# Patient Record
Sex: FEMALE | Race: WHITE | NOT HISPANIC OR LATINO | URBAN - METROPOLITAN AREA
[De-identification: names, ages, dates, MRNs, and addresses within clinical notes are randomized per-mention and may not be internally consistent; named-entity substitution may affect disease eponyms.]

---

## 2020-09-02 ENCOUNTER — EMERGENCY (EMERGENCY)
Facility: HOSPITAL | Age: 70
LOS: 0 days | Discharge: HOME | End: 2020-09-02
Attending: EMERGENCY MEDICINE | Admitting: EMERGENCY MEDICINE
Payer: MEDICARE

## 2020-09-02 VITALS
HEART RATE: 82 BPM | OXYGEN SATURATION: 99 % | WEIGHT: 149.91 LBS | TEMPERATURE: 97 F | HEIGHT: 66 IN | DIASTOLIC BLOOD PRESSURE: 76 MMHG | SYSTOLIC BLOOD PRESSURE: 144 MMHG | RESPIRATION RATE: 18 BRPM

## 2020-09-02 DIAGNOSIS — Z77.21 CONTACT WITH AND (SUSPECTED) EXPOSURE TO POTENTIALLY HAZARDOUS BODY FLUIDS: ICD-10-CM

## 2020-09-02 DIAGNOSIS — Z98.890 OTHER SPECIFIED POSTPROCEDURAL STATES: Chronic | ICD-10-CM

## 2020-09-02 PROCEDURE — 71046 X-RAY EXAM CHEST 2 VIEWS: CPT | Mod: 26

## 2020-09-02 PROCEDURE — 99283 EMERGENCY DEPT VISIT LOW MDM: CPT

## 2020-09-02 RX ORDER — ASPIRIN/CALCIUM CARB/MAGNESIUM 324 MG
1 TABLET ORAL
Qty: 0 | Refills: 0 | DISCHARGE

## 2020-09-02 NOTE — ED PROVIDER NOTE - CARE PROVIDER_API CALL
Benny Watson  Infectious Disease  [address] [place] [id number]  Phone: [phone]  Fax: [phone]  Follow Up Time: 1-3 Days

## 2020-09-02 NOTE — ED PROVIDER NOTE - CLINICAL SUMMARY MEDICAL DECISION MAKING FREE TEXT BOX
Patient is concerned for body fluid exposure in January a patient known to be hiv + with peg tube had splash in her eye she has developed night sweats but denies any headache, visual changes, chest pain, sob she has follow up with immunology next week but wants to be tested for HIV today.  we obtained. I will discharge with follow up to dr owen we discussed indications to return at this time

## 2020-09-02 NOTE — ED PROVIDER NOTE - NSFOLLOWUPINSTRUCTIONS_ED_ALL_ED_FT
Contact Precautions  Contact precautions are guidelines around caring for a person who has a disease that can be spread by contact with the patient or the patient's environment. Contact can happen through:  Contact between the patient's skin and another person's skin (skin-to-skin contact).Contact with bodily fluids, such as saliva, stool (feces), blood, and fluid from open wounds.Contact with items in the environment that have been contaminated with infected bodily fluids.Examples of diseases spread by contact with the patient or the patient's environment include methicillin-resistant Staphylococcus aureus (MRSA), vancomycin-resistant enterococci (VRE), Clostridioidesdifficile, respiratory syncytial virus (RSV), and some types of severe acute respiratory syndrome (SARS). Following these guidelines helps to prevent the disease from spreading to others.  Guidelines for patients  If you have a disease that can be spread by skin-to-skin contact or by contact with bodily fluids or items within the environment, follow these guidelines during your stay in the hospital:  Check with your nurse before you leave the room where you are being treated.Wear a gown and a covering or bandage (dressing) over the infected area if you need to leave your room.Wash your hands often with soap and water for at least 20 seconds. This is the best way to prevent spread of the disease. If soap and water are not available, use an alcohol-based hand .Guidelines for visitors  If you are visiting a person who has a disease that can be spread by direct contact with the person, or through his or her environment, follow these guidelines:  Check with a nurse before you enter a room that has a sign that says "Contact Precautions."If a nurse says that you can go into the room, you will be asked to wash your hands with soap and water or use an alcohol-based hand . You will also be asked to wear a gown and gloves.Do not take off your gown or gloves in the room until you are about to leave.Do not eat or drink in the room unless you ask a nurse first.Do not touch anything in the room unless you ask a nurse first.Right before you leave the room:  Take off your gloves first. Be careful not to touch the outside of the gloves.Take off your gown. Be careful not to touch the outside of the gown.Leave your gloves and gown in the room as told by the nurse.Wash your hands with soap and water for at least 20 seconds. This is the best way to prevent spread of the disease. If soap and water are not available, use an alcohol-based hand .Summary  Contact precautions are guidelines to prevent the spread of diseases through direct contact with a person's skin, a person's bodily fluids, or items within the person's environment.If you are a patient, ask a nurse before you leave your room. Wear a gown and a covering or bandage (dressing) over the infected area if you need to leave the room.If you are a visitor, wear a gown and gloves. Do not touch anything in the room. Do not eat or drink in the room.For both patients and visitors, washing hands often with soap and water for at least 20 seconds is the best way to prevent infection. If soap and water are not available, use an alcohol-based hand .This information is not intended to replace advice given to you by your health care provider. Make sure you discuss any questions you have with your health care provider.    Document Released: 05/03/2016 Document Revised: 05/18/2020 Document Reviewed: 05/18/2020  Elsevier Patient Education © 2020 [employer]

## 2020-09-02 NOTE — ED ADULT NURSE NOTE - NSIMPLEMENTINTERV_GEN_ALL_ED
Implemented All Universal Safety Interventions:  [place] to call system. Call bell, personal items and telephone within reach. Instruct patient to call for assistance. Room bathroom lighting operational. Non-slip footwear when patient is off stretcher. Physically safe environment: no spills, clutter or unnecessary equipment. Stretcher in lowest position, wheels locked, appropriate side rails in place.

## 2020-09-02 NOTE — ED PROVIDER NOTE - PROGRESS NOTE DETAILS
ATTENDING NOTE: 69 y/o F PMH CAD s/p stent and HTN presents to the ED requesting HIV testing. Pt was exposed to secretions from a peg tube of someone who is HIV positive back in January and over the past few months has been having night time sweats, nausea and intermittent fatigue all worsening over the past 2-3 days. Pt has no fevers, vomiting, diarrhea, chest pain, back pain or any joint pain and has no other medical complaints at this time. As of note, Pt has immunology follow up on Wednesday. On exam: NCAT. Lungs CTAB. RRR, S1S2 noted. Radial pulses 2+ and equal, pedal pulses 2+ and equal. Abdomen soft, NT/ND, no rebound or guarding. FROM x4 extremities. No focal neuro deficits. A/P: Will obtain HIV testing and CXR. Pt will remain in the ED for the results. Discussed results with pt.  All questions were answered and return precautions discussed.  Pt is asx and comfortable at this time.  Unremarkable re-exam.  No further concerns at this time from pt.  Will follow up with MARTINA and alyssa .  Pt understands and agrees with tx plan.

## 2020-09-02 NOTE — ED PROVIDER NOTE - ATTENDING CONTRIBUTION TO CARE
I was present for and supervised the key and critical aspects of the procedures performed during the care of the patient. ATTENDING NOTE: 71 y/o F PMH CAD s/p stent and HTN presents to the ED requesting HIV testing. Pt was exposed to secretions from a peg tube of someone who is HIV positive back in January and over the past few months has been having night time sweats, nausea and intermittent fatigue all worsening over the past 2-3 days. Pt has no fevers, vomiting, diarrhea, chest pain, back pain or any joint pain and has no other medical complaints at this time. As of note, Pt has immunology follow up on Wednesday. On exam: NCAT. Lungs CTAB. RRR, S1S2 noted. Radial pulses 2+ and equal, pedal pulses 2+ and equal. Abdomen soft, NT/ND, no rebound or guarding. FROM x4 extremities. No focal neuro deficits. A/P: Will obtain HIV testing and CXR. Pt will remain in the ED for the results.

## 2020-09-02 NOTE — ED PROVIDER NOTE - PATIENT PORTAL LINK FT
You can access the FollowMyHealth Patient Portal offered by [facility] by registering at the following website: [email or web address]. By joining [facility]’s FollowMyHealth portal, you will also be able to view your health information using other applications (apps) compatible with our system.

## 2020-09-02 NOTE — ED PROVIDER NOTE - NS ED ROS FT
Constitutional: + malaise.   Eyes: No visual changes, eye pain or discharge.   ENMT: No hearing changes, pain, discharge or infections.  Cardiac: No SOB or edema. No chest pain with exertion.  Respiratory: No cough or respiratory distress.   GI: No nausea, vomiting, diarrhea or abdominal pain.  : No dysuria, frequency or burning. No Discharge  MS: No myalgia, muscle weakness, joint pain or back pain.  Neuro: No headache or weakness.   Skin: No skin rash.  Except as documented in the HPI, all other systems are negative.

## 2020-09-02 NOTE — ED PROVIDER NOTE - OBJECTIVE STATEMENT
70 y.o female w/ hx of CAD, HTN, HLD presents to the ED for evaluation for HIV test.  States in January had gastric secretion from feeding tube go in eye.  Over past few months vague sxs including nausea, intermittent night sweats, abd cramping.  has been worked up by Dr. Watson's office and has f/u with immunologist next Wednesday.  States she does not believe she was tested for HIV and is requesting once at this time.  Does not want to wait for Wednesday. Currently asx.

## 2020-09-17 ENCOUNTER — TRANSCRIPTION ENCOUNTER (OUTPATIENT)
Age: 70
End: 2020-09-17

## 2020-11-05 PROBLEM — I42.9 CARDIOMYOPATHY, UNSPECIFIED: Chronic | Status: ACTIVE | Noted: 2020-09-02

## 2020-11-05 PROBLEM — I10 ESSENTIAL (PRIMARY) HYPERTENSION: Chronic | Status: ACTIVE | Noted: 2020-09-02

## 2020-11-05 PROBLEM — Z95.5 PRESENCE OF CORONARY ANGIOPLASTY IMPLANT AND GRAFT: Chronic | Status: ACTIVE | Noted: 2020-09-02

## 2020-11-06 ENCOUNTER — OUTPATIENT (OUTPATIENT)
Dept: OUTPATIENT SERVICES | Facility: HOSPITAL | Age: 70
LOS: 1 days | Discharge: HOME | End: 2020-11-06

## 2020-11-06 ENCOUNTER — APPOINTMENT (OUTPATIENT)
Dept: UROGYNECOLOGY | Facility: CLINIC | Age: 70
End: 2020-11-06
Payer: MEDICARE

## 2020-11-06 ENCOUNTER — RESULT CHARGE (OUTPATIENT)
Age: 70
End: 2020-11-06

## 2020-11-06 VITALS
SYSTOLIC BLOOD PRESSURE: 108 MMHG | BODY MASS INDEX: 25.71 KG/M2 | DIASTOLIC BLOOD PRESSURE: 70 MMHG | HEIGHT: 66 IN | WEIGHT: 160 LBS

## 2020-11-06 DIAGNOSIS — N95.2 POSTMENOPAUSAL ATROPHIC VAGINITIS: ICD-10-CM

## 2020-11-06 DIAGNOSIS — Z86.39 PERSONAL HISTORY OF OTHER ENDOCRINE, NUTRITIONAL AND METABOLIC DISEASE: ICD-10-CM

## 2020-11-06 DIAGNOSIS — N81.3 COMPLETE UTEROVAGINAL PROLAPSE: ICD-10-CM

## 2020-11-06 DIAGNOSIS — Z98.890 OTHER SPECIFIED POSTPROCEDURAL STATES: Chronic | ICD-10-CM

## 2020-11-06 DIAGNOSIS — Z78.9 OTHER SPECIFIED HEALTH STATUS: ICD-10-CM

## 2020-11-06 DIAGNOSIS — Z87.891 PERSONAL HISTORY OF NICOTINE DEPENDENCE: ICD-10-CM

## 2020-11-06 DIAGNOSIS — R39.15 URGENCY OF URINATION: ICD-10-CM

## 2020-11-06 LAB
BILIRUB UR QL STRIP: NORMAL
CLARITY UR: CLEAR
COLLECTION METHOD: NORMAL
GLUCOSE UR-MCNC: NORMAL
HCG UR QL: 0.2 EU/DL
HGB UR QL STRIP.AUTO: NORMAL
KETONES UR-MCNC: NORMAL
LEUKOCYTE ESTERASE UR QL STRIP: NORMAL
NITRITE UR QL STRIP: NORMAL
PH UR STRIP: 6
PROT UR STRIP-MCNC: NORMAL
SP GR UR STRIP: 1.01

## 2020-11-06 PROCEDURE — 99205 OFFICE O/P NEW HI 60 MIN: CPT

## 2020-11-06 PROCEDURE — 51701 INSERT BLADDER CATHETER: CPT

## 2020-11-07 LAB
APPEARANCE: CLEAR
BILIRUBIN URINE: NEGATIVE
BLOOD URINE: NEGATIVE
COLOR: YELLOW
GLUCOSE QUALITATIVE U: NEGATIVE
KETONES URINE: NEGATIVE
LEUKOCYTE ESTERASE URINE: NEGATIVE
NITRITE URINE: NEGATIVE
PH URINE: 6
PROTEIN URINE: NORMAL
SPECIFIC GRAVITY URINE: 1.03
UROBILINOGEN URINE: NORMAL

## 2020-11-07 NOTE — HISTORY OF PRESENT ILLNESS
[FreeTextEntry1] : \par Pt with pelvic floor dysfunction here for urogynecologic evaluation. She describes: \par \par Chief PFD: prolapse\par \par RN\par \par Pelvic organ prolapse: s/p cindy, +bulge, for 1.5 months, not worsening, denies splinting\par Stress urinary incontinence: denies\par Overactive bladder syndrome: +frequency, +urgency, +nocturia, no urge incontinence, for months, worsening, no prior treatment, no glaucoma\par Voiding dysfunction: yes Incomplete bladder emptying, yes hesitancy \par Lower urinary tract/vaginal symptoms: no recurrent UTIs per year, no hematuria, no dysuria, no bladder pain \par Fecal incontinence: denies\par Defecatory dysfunction: sausage\par Sexual dysfunction: minimally active, denies issues\par Pelvic pain: denies\par Vaginal dryness: denies\par \par Her pelvic floor symptoms are significantly bothersome and negatively impacting her quality of life. \par \par

## 2020-11-07 NOTE — COUNSELING
[FreeTextEntry1] : \par We will notify you of the urine results if they are abnormal\par \par Please increase your water intake to at least 5 cups of water per day\par \par For 4-5 days, cut one item from the list out of your diet.\par \par After 4-5 days, it it makes a difference, you have to decide if it is worth keeping it out of your diet to help with the urinary issues.\par \par If it does not make a difference, you should add it back into your diet and remove another item for another 4-5 days.\par \par Apply a pea size amount of the cream to the opening of the vagina every night for two weeks followed by three nights per week\par \par Please call my office if you have any issues with the cost or side effects of the medication.\par \par Schedule pessary fitting with my PADafne\par \par Please review the surgical handouts

## 2020-11-07 NOTE — DISCUSSION/SUMMARY
[FreeTextEntry1] : \par Complete uterovaginal prolapse-\par The patient was counseled regarding the possible natural progression of prolapse and the clinical consequences of worsening prolapse. The stage and the location of the prolapse was reviewed with the patient. She was counseled regarding the management strategies including observation, pessary placement and surgery. (Lefort colpocleisis versus robotic hysterectomyBS0//sacrocolpopexy). With further discussion about surgical types, the patient wants to preserve the length of the vagina for sexual activity and therefore the Lefort is off the table. The patient voiced understanding and agrees with pessary management.\par \par I WILL RECOMMEND AN EVALUATION WITH DR HOPKINS (COLORECTAL) TO RULE OUT RECTAL PROLAPSE PRIOR TO SCHEDULING HER FOR VAGINAL PROLAPSE SURGERY.\par \par Atrophic vaginitis-\par We reviewed the risks, benefits, alternatives and indications of local estrogen therapy and I gave her a handout that covers this information. She does opt to begin this therapy. I have given her a prescription and specific instructions on how to use the estrogen cream, applied with a finger at a low dose for urogenital atrophy.\par \par Urinary urgency-\par The pathophysiology of the above condition was discussed with the patient. The patient was given information and education on pelvic floor muscle exercises/rehabilitation, avoidance of dietary bladder irritants, and other strategies to improve bladder control, such as scheduled voiding. She was counseled regarding further management strategies for overactive bladder and urge incontinence including pelvic floor physical therapy, medications or surgical management. The patient voiced understanding and agrees with diet changes. We will follow up on her urine tests.\par \par

## 2020-11-07 NOTE — PHYSICAL EXAM
[Chaperone Present] : A chaperone was present in the examining room during all aspects of the physical examination [FreeTextEntry1] : Void: missed the hat, drank 2-3 cups of water in the bathroom\par PVR: 115 cc\par Urethra was prepped in sterile fashion and then a sterile catheter was used by me to drain the bladder.\par \par GH: 7    pB:  4 TVL: 8  C: 0  D: -5  Aa: +3 Ba: +7  Ap: -2 Bp:-2\par  \par laparoscopic incisions noted\par normal perineal sensation\par normal perineal reflexes\par negative cough stress test with and without reduction\par positive atrophy\par positive urethral caruncle\par positive urethral hypermobility\par bilateral levator ani spasm,  no tenderness\par no urethral tenderness\par no bladder tenderness\par no cervical tenderness\par no uterine tenderness\par good sphincter tone\par +hemorrhoids\par thin rectovaginal septum\par possible rectal prolapse\par 1/5 Kegel\par

## 2020-11-08 LAB — BACTERIA UR CULT: NORMAL

## 2020-12-01 ENCOUNTER — APPOINTMENT (OUTPATIENT)
Dept: UROGYNECOLOGY | Facility: CLINIC | Age: 70
End: 2020-12-01
Payer: MEDICARE

## 2020-12-01 ENCOUNTER — OUTPATIENT (OUTPATIENT)
Dept: OUTPATIENT SERVICES | Facility: HOSPITAL | Age: 70
LOS: 1 days | Discharge: HOME | End: 2020-12-01

## 2020-12-01 VITALS
SYSTOLIC BLOOD PRESSURE: 116 MMHG | WEIGHT: 155 LBS | TEMPERATURE: 97.8 F | BODY MASS INDEX: 25.02 KG/M2 | DIASTOLIC BLOOD PRESSURE: 60 MMHG

## 2020-12-01 DIAGNOSIS — Z98.890 OTHER SPECIFIED POSTPROCEDURAL STATES: Chronic | ICD-10-CM

## 2020-12-01 PROCEDURE — 57160 INSERT PESSARY/OTHER DEVICE: CPT

## 2020-12-01 NOTE — HISTORY OF PRESENT ILLNESS
[FreeTextEntry1] : Patient is here for pessary fitting. GH: 7 TVL: 8\par Last seen 11/6/20 as a new pt for prolapse and urge incontinence\par \par Pt is an LPN\par \par s/p cindy\par Pt is sexually active\par \par GH: 7 pB: 4 TVL: 8 C: 0 D: -5 Aa: +3 Ba: +7 Ap: -2 Bp:-2\par \par thin rectovaginal septum\par possible rectal prolapse\par \par PVR: 115cc (pt missed the hat)\par \par Estrace cream for atrophy: not using it every day\par \par Today pt states that the prolapse is always bothering her. States that her cardiologist started her on a water pill and she was urinating all the time, so she stopped it. Pt is c/o frequency and urgency and would like to start bladder medication at this time. Also states that she did not make an appt with Dr Cm as she did not understand that she had to do that. \par \par

## 2020-12-01 NOTE — DISCUSSION/SUMMARY
[FreeTextEntry1] : Complete Uterovaginal Prolapse:\par Ring and support #7 placed without difficulty. Remained in place with Valsalva, coughing, and ambulating. \par The patient tolerated all fittings well.\par Patient was taught to remove and the place the pessary on her own as she would like to be able to do that at home.\par \par Urge Incontinence\par Rx Trospium 20mg BID.\par Precautions reviewed.\par \par Atrophic Vaginitis:\par Advised to continue to apply a pea size amount of the cream to the opening of the vagina three nights per week.\par \par Pt provided with Dr Cm information to make an appointment to rule out rectal prolapse prior to pt being considered for surgical management with Dr Batista.\par \par Will return in 3 weeks for pessary check and in 6 weeks for follow up for med check or earlier if she has any issues.\par \par \par

## 2020-12-01 NOTE — COUNSELING
[FreeTextEntry1] : Please call the office if you have any issues with vaginal pain, vaginal bleeding, difficulty urinating or having a bowel movement or if the pessary falls out so that we can arrange another size pessary.\par \par Please follow up for pessary maintenance in 2-3 weeks with NIHARIKA Leos\par \par If you feel like you have an infection it is important for you to call our office and we will arrange testing of your urine.\par \par Please begin taking Trospium 20mg twice a day. It takes up to 6 weeks to go into full effect. Please  your refill when you complete the 1st bottle.\par \par Please continue to apply a pea size amount to the opening of the vagina three times a week. \par \par Please call my office if you have any issues with the cost or side effects of the medication. \par \par Please call Dr Ronnie Cm at [phone] to make an appointment for evaluation to rule out rectal prolapse. \par \par Schedule a 6 weeks follow up med check appointment.\par \par

## 2020-12-02 ENCOUNTER — TRANSCRIPTION ENCOUNTER (OUTPATIENT)
Age: 70
End: 2020-12-02

## 2020-12-04 DIAGNOSIS — N95.2 POSTMENOPAUSAL ATROPHIC VAGINITIS: ICD-10-CM

## 2020-12-04 DIAGNOSIS — N81.3 COMPLETE UTEROVAGINAL PROLAPSE: ICD-10-CM

## 2020-12-04 DIAGNOSIS — R39.15 URGENCY OF URINATION: ICD-10-CM

## 2020-12-07 ENCOUNTER — NON-APPOINTMENT (OUTPATIENT)
Age: 70
End: 2020-12-07

## 2020-12-14 ENCOUNTER — APPOINTMENT (OUTPATIENT)
Dept: UROGYNECOLOGY | Facility: CLINIC | Age: 70
End: 2020-12-14

## 2020-12-15 ENCOUNTER — OUTPATIENT (OUTPATIENT)
Dept: OUTPATIENT SERVICES | Facility: HOSPITAL | Age: 70
LOS: 1 days | Discharge: HOME | End: 2020-12-15

## 2020-12-15 ENCOUNTER — APPOINTMENT (OUTPATIENT)
Dept: UROGYNECOLOGY | Facility: CLINIC | Age: 70
End: 2020-12-15
Payer: MEDICARE

## 2020-12-15 VITALS — HEIGHT: 66 IN | DIASTOLIC BLOOD PRESSURE: 62 MMHG | SYSTOLIC BLOOD PRESSURE: 98 MMHG

## 2020-12-15 DIAGNOSIS — R39.15 URGENCY OF URINATION: ICD-10-CM

## 2020-12-15 DIAGNOSIS — Z98.890 OTHER SPECIFIED POSTPROCEDURAL STATES: Chronic | ICD-10-CM

## 2020-12-15 PROCEDURE — 99213 OFFICE O/P EST LOW 20 MIN: CPT

## 2020-12-15 NOTE — COUNSELING
[FreeTextEntry1] : Please call the office if you have any issues with vaginal pain, vaginal bleeding, difficulty urinating or having a bowel movement or if the pessary falls out so that we can arrange another size pessary.\par \par Please continue to apply a pea size amount of the cream to the opening of the vagina three nights per week.\par \par Pleases restart Trospium 20mg twice a day if you feel that urinary frequency is bothersome again. It takes up to 6 weeks for medication to take full effect. Please use bowel recipe for constipation. You may also take Colace, or Dulcolax or Miralax over the counter for constipation. \par \par Please follow up for pessary maintenance in 3 months with NIHARIKA Leos\par

## 2020-12-15 NOTE — HISTORY OF PRESENT ILLNESS
[FreeTextEntry1] : Patient is here for routine pessary check and cleaning for incomplete uterovaginal prolapse.\par Last seen 12/1/20 for pessary insertion, Ring and support #7\par \par Pt is an LPN\par \par s/p cindy\par Pt is sexually active\par \par GH: 7 pB: 4 TVL: 8 C: 0 D: -5 Aa: +3 Ba: +7 Ap: -2 Bp:-2\par \par thin rectovaginal septum\par possible rectal prolapse\par \par PVR: 115cc (pt missed the hat)\par \par Estrace cream for atrophy\par \par Today, patient is doing well and has no complaints. States that her bulge and prolapse is much better with the pessary in. States that She stopped Trospium as it gave her constipation and her hair was falling out. Reports that her urinary frequency is much better and doesn’t want to take Trospium at this time. \par

## 2020-12-15 NOTE — DISCUSSION/SUMMARY
[FreeTextEntry1] : Incomplete Uterovaginal Prolapse:\par Ring and support #7 removed, cleaned, inspected and reinserted. The patient tolerated this well. \par No bleeding, lesions, abnormal discharge were noted. \par The patient will follow up in 3 months for routine pessary management.\par \par Atrophic Vaginitis:\par Advised to continue to apply a pea size amount of the cream to the opening of the vagina three nights per week.\par \par Urinary urgency\par Advised to restart Trospium 20mg BID if her urinary symptoms become bothersome again.\par If she restarts Trospium advised to call and make a 6 weeks f/u med check appt\par Advised to use bowel recipe or take stool softeners for constipation\par \par Vaginal discharge\par Vaginitis culture obtained. \par \par Pt may [place] or [place]. Advised that she will need to find a urogyn or gyn office for pessary cleaning every 3 months. Pt verbalized understanding.

## 2020-12-15 NOTE — PHYSICAL EXAM
[Chaperone Present] : A chaperone was present in the examining room during all aspects of the physical examination [No Acute Distress] : in no acute distress [Well developed] : well developed [Well Nourished] : ~L well nourished [FreeTextEntry1] : + copious amount of milky vaginal discharge. Cultures obtained.

## 2020-12-21 ENCOUNTER — NON-APPOINTMENT (OUTPATIENT)
Age: 70
End: 2020-12-21

## 2020-12-22 ENCOUNTER — APPOINTMENT (OUTPATIENT)
Dept: SURGERY | Facility: CLINIC | Age: 70
End: 2020-12-22

## 2020-12-22 ENCOUNTER — APPOINTMENT (OUTPATIENT)
Dept: SURGERY | Facility: CLINIC | Age: 70
End: 2020-12-22
Payer: MEDICARE

## 2020-12-22 VITALS
SYSTOLIC BLOOD PRESSURE: 120 MMHG | WEIGHT: 156 LBS | DIASTOLIC BLOOD PRESSURE: 70 MMHG | BODY MASS INDEX: 25.07 KG/M2 | TEMPERATURE: 96.1 F | HEIGHT: 66 IN | HEART RATE: 66 BPM

## 2020-12-22 PROCEDURE — 46600 DIAGNOSTIC ANOSCOPY SPX: CPT

## 2020-12-22 PROCEDURE — 99203 OFFICE O/P NEW LOW 30 MIN: CPT

## 2020-12-22 NOTE — ASSESSMENT
[FreeTextEntry1] : Ms. PISANO has pelvic organ prolapse. She denies fecal incontinence and or difficulty with her bowel movements other than occasional constipation. She has no notable rectal prolapse on physical exam. If she had any symptoms or questionable physical exam features I would send her for a definite progress. I believe Ms. ANGULO does not have rectal prolapse. I have advised her that rarely after having repaired the uterine vaginal prolapse rectal rectal prolapse becomes evident. That being said I do not believe she needs further workup for rectal prolapse at this time. She can proceed with the pelvic organ reconstruction and followup with me as needed.

## 2020-12-22 NOTE — PROCEDURE
[FreeTextEntry1] : Anoscopy performed using a disposable anoscope.  The patient was place in the left lateral decubitus position. After MERCEDEZ was performed the anoscope was inserted and the distal rectum was evaluated along with the anal canal and anal margin.\par \par Findings:\par Examination of the perineum reveals minor perianal skin tags is no erythema induration or drainage or bleeding. There is no significant perineal to send with Valsalva. There is no obvious rectal prolapse with Valsalva.\par \par MERCEDEZ, good tone, no palpable mass, no palpable rectal prolapse with Valsalva.\par \par Anoscopy, grade 2 internal hemorrhoids, no active bleeding, no stigmata of rectal prolapse.

## 2020-12-23 DIAGNOSIS — N89.8 OTHER SPECIFIED NONINFLAMMATORY DISORDERS OF VAGINA: ICD-10-CM

## 2020-12-23 DIAGNOSIS — N95.2 POSTMENOPAUSAL ATROPHIC VAGINITIS: ICD-10-CM

## 2020-12-23 DIAGNOSIS — R39.15 URGENCY OF URINATION: ICD-10-CM

## 2020-12-23 DIAGNOSIS — N81.3 COMPLETE UTEROVAGINAL PROLAPSE: ICD-10-CM

## 2021-01-13 ENCOUNTER — OUTPATIENT (OUTPATIENT)
Dept: OUTPATIENT SERVICES | Facility: HOSPITAL | Age: 71
LOS: 1 days | Discharge: HOME | End: 2021-01-13

## 2021-01-13 ENCOUNTER — APPOINTMENT (OUTPATIENT)
Dept: UROGYNECOLOGY | Facility: CLINIC | Age: 71
End: 2021-01-13
Payer: MEDICARE

## 2021-01-13 VITALS
SYSTOLIC BLOOD PRESSURE: 100 MMHG | BODY MASS INDEX: 25.07 KG/M2 | WEIGHT: 156 LBS | DIASTOLIC BLOOD PRESSURE: 62 MMHG | HEIGHT: 66 IN

## 2021-01-13 DIAGNOSIS — Z98.890 OTHER SPECIFIED POSTPROCEDURAL STATES: Chronic | ICD-10-CM

## 2021-01-13 PROCEDURE — 99213 OFFICE O/P EST LOW 20 MIN: CPT

## 2021-01-13 RX ORDER — TROSPIUM CHLORIDE 20 MG/1
20 TABLET, FILM COATED ORAL
Qty: 60 | Refills: 1 | Status: DISCONTINUED | COMMUNITY
Start: 2020-12-01 | End: 2021-01-13

## 2021-01-13 NOTE — DISCUSSION/SUMMARY
[FreeTextEntry1] : Uterovaginal prolapse\par F/u in 2 months for pessary cleaning\par \par Atrophic Vaginitis:\par Advised to continue to apply a pea size amount of the cream to the opening of the vagina three nights per week.\par

## 2021-01-13 NOTE — HISTORY OF PRESENT ILLNESS
[FreeTextEntry1] : Pt is here for follow up. \par Last seen on 12/15/20 for pessary cleaning Ring and support #7\par \par Pt is an LPN\par \par s/p cindy\par Pt is sexually active\par \par GH: 7 pB: 4 TVL: 8 C: 0 D: -5 Aa: +3 Ba: +7 Ap: -2 Bp:-2\par \par thin rectovaginal septum\par possible rectal prolapse\par \par PVR: 115cc (pt missed the hat)\par \par Estrace cream for atrophy\par Bowel recipe\par s/p Trospium 20mg BID: caused constipation\par \par Today pt states that she's doing well. She did not restart Trospium as it was causing constipation. She did not try bowel recipe yet but plans on it. States that she has been seeing a cardiologist and wanted to know if she needs to have a heart surgery if she can have the pessary in at that time. She's not moving to [place] at this time. Is not bothered by urgency and frequency any longer.\par

## 2021-01-26 ENCOUNTER — RX RENEWAL (OUTPATIENT)
Age: 71
End: 2021-01-26

## 2021-03-17 ENCOUNTER — APPOINTMENT (OUTPATIENT)
Dept: UROGYNECOLOGY | Facility: CLINIC | Age: 71
End: 2021-03-17
Payer: MEDICARE

## 2021-03-17 ENCOUNTER — OUTPATIENT (OUTPATIENT)
Dept: OUTPATIENT SERVICES | Facility: HOSPITAL | Age: 71
LOS: 1 days | Discharge: HOME | End: 2021-03-17

## 2021-03-17 VITALS
HEIGHT: 66 IN | BODY MASS INDEX: 25.07 KG/M2 | WEIGHT: 156 LBS | DIASTOLIC BLOOD PRESSURE: 64 MMHG | SYSTOLIC BLOOD PRESSURE: 102 MMHG

## 2021-03-17 DIAGNOSIS — Z98.890 OTHER SPECIFIED POSTPROCEDURAL STATES: Chronic | ICD-10-CM

## 2021-03-17 PROCEDURE — 99213 OFFICE O/P EST LOW 20 MIN: CPT

## 2021-03-17 NOTE — HISTORY OF PRESENT ILLNESS
[FreeTextEntry1] : Patient is here for routine pessary cleaning for incomplete uterovaginal prolapse. ring and support #7 \par Last seen 1/13/21 for follow up for incontinence. \par \par Pt is an LPN\par \par s/p cindy\par Pt is sexually active\par \par GH: 7 pB: 4 TVL: 8 C: 0 D: -5 Aa: +3 Ba: +7 Ap: -2 Bp:-2\par \par thin rectovaginal septum\par possible rectal prolapse\par \par PVR: 115cc (pt missed the hat)\par \par Estrace cream for atrophy\par Bowel recipe\par s/p Trospium 20mg BID: caused constipation\par \par Today, patient is doing well and has no complaints. She's not taking Trospium doesn't remember if she took it or not. She's more concerned with seeing a cardiologist, has been having chest pain, feels blockages. She does not feel that  she's leaking or having any bladder problems right now. Feels that the pessary is helping her with the prolapse. Does c/o vaginal discharge and has been removing and cleaning the pessary herself every few weeks. \par

## 2021-03-17 NOTE — COUNSELING
[FreeTextEntry1] : Please call the office if you have any issues with vaginal pain, vaginal bleeding, difficulty urinating or having a bowel movement or if the pessary falls out so that we can arrange another size pessary.\par \par Please continue to apply a pea size amount of the cream to the opening of the vagina three nights per week.\par \par Please follow up for pessary maintenance in 3 months with NIHARIKA Leos\par \par

## 2021-03-17 NOTE — DISCUSSION/SUMMARY
[FreeTextEntry1] : Incomplete Uterovaginal Prolapse:\par Ring and support #7  removed, cleaned, inspected and reinserted. The patient tolerated this well. \par No bleeding, lesions, abnormal discharge were noted. \par The patient will follow up in 3 months for routine pessary management.\par \par Atrophic Vaginitis:\par Advised to continue to apply a pea size amount of the cream to the opening of the vagina three nights per week.\par \par Vaginal discharge\par culture obtained, will call pt if abnormal results

## 2021-03-17 NOTE — PHYSICAL EXAM
[Chaperone Present] : A chaperone was present in the examining room during all aspects of the physical examination [FreeTextEntry1] : + copious vaginal discharge [No Acute Distress] : in no acute distress [Well developed] : well developed [Well Nourished] : ~L well nourished

## 2021-03-25 DIAGNOSIS — N81.3 COMPLETE UTEROVAGINAL PROLAPSE: ICD-10-CM

## 2021-03-25 DIAGNOSIS — N95.2 POSTMENOPAUSAL ATROPHIC VAGINITIS: ICD-10-CM

## 2021-03-25 DIAGNOSIS — N89.8 OTHER SPECIFIED NONINFLAMMATORY DISORDERS OF VAGINA: ICD-10-CM

## 2021-06-15 ENCOUNTER — OUTPATIENT (OUTPATIENT)
Dept: OUTPATIENT SERVICES | Facility: HOSPITAL | Age: 71
LOS: 1 days | Discharge: HOME | End: 2021-06-15

## 2021-06-15 ENCOUNTER — APPOINTMENT (OUTPATIENT)
Dept: UROGYNECOLOGY | Facility: CLINIC | Age: 71
End: 2021-06-15
Payer: MEDICARE

## 2021-06-15 VITALS
DIASTOLIC BLOOD PRESSURE: 68 MMHG | HEIGHT: 66 IN | WEIGHT: 156 LBS | SYSTOLIC BLOOD PRESSURE: 112 MMHG | BODY MASS INDEX: 25.07 KG/M2

## 2021-06-15 DIAGNOSIS — N95.2 POSTMENOPAUSAL ATROPHIC VAGINITIS: ICD-10-CM

## 2021-06-15 DIAGNOSIS — Z98.890 OTHER SPECIFIED POSTPROCEDURAL STATES: Chronic | ICD-10-CM

## 2021-06-15 PROCEDURE — 99213 OFFICE O/P EST LOW 20 MIN: CPT

## 2021-06-15 NOTE — DISCUSSION/SUMMARY
[FreeTextEntry1] : Complete Uterovaginal Prolapse:\par Pt has her pessary with her. Does not want to place it back in today as it causes discharge and it doesn't bother her. Pt is considering surgical management for the prolapse. Advised to first take care of her cardiac issues and then follow up for further management for the prolapse. \par Follow up 6 months or sooner if needed. \par \par Atrophic Vaginitis:\par Advised to continue to apply a pea size amount of the cream to the opening of the vagina three nights per week.\par

## 2021-06-15 NOTE — COUNSELING
[FreeTextEntry1] : Please call the office if you have any issues with vaginal pain, vaginal bleeding, difficulty urinating or having a bowel movement. \par \par Please bring the pessary with you if you would like to have it re-inserted. \par \par Please continue to apply a pea size amount of the cream to the opening of the vagina three nights per week.\par \par Please follow up in 6 months or as needed with NIHARIKA Leos\par

## 2021-06-15 NOTE — HISTORY OF PRESENT ILLNESS
[FreeTextEntry1] : Patient is here for routine pessary cleaning for incomplete uterovaginal prolapse.\par Last seen 3/17/21 for pessary cleaning. ring and support # 7\par \par Pt is an LPN\par \par s/p cindy\par Pt is sexually active\par \par GH: 7 pB: 4 TVL: 8 C: 0 D: -5 Aa: +3 Ba: +7 Ap: -2 Bp:-2\par \par thin rectovaginal septum\par possible rectal prolapse\par \par PVR: 115cc (pt missed the hat)\par \par Estrace cream for atrophy\par Bowel recipe\par s/p Trospium 20mg BID: caused constipation\par \par Today, patient is doing well and has no complaints. She took out the pessary herself and brought it with her. States that she lives in [place] and [place] and was seen by a GYN in [place], had pap smear done that was abnormal then had colpo done with a biopsy, did not get results, assumes that they are normal. States that she has been putting the pessary in and taking it out. It continues to cause the discharge and she is doing ok w/o the pessary. Considering to have surgery for her incomplete uterine prolapse. \par Pt also states that she has been undergoing cardiac work up and needs to have open heart surgery for septal defect. She was going to do it in [place] but decided to do it in [place]. \par

## 2021-09-14 ENCOUNTER — APPOINTMENT (OUTPATIENT)
Dept: UROGYNECOLOGY | Facility: CLINIC | Age: 71
End: 2021-09-14
Payer: MEDICARE

## 2021-09-14 ENCOUNTER — OUTPATIENT (OUTPATIENT)
Dept: OUTPATIENT SERVICES | Facility: HOSPITAL | Age: 71
LOS: 1 days | Discharge: HOME | End: 2021-09-14

## 2021-09-14 VITALS — DIASTOLIC BLOOD PRESSURE: 65 MMHG | BODY MASS INDEX: 22.76 KG/M2 | WEIGHT: 141 LBS | SYSTOLIC BLOOD PRESSURE: 135 MMHG

## 2021-09-14 DIAGNOSIS — K59.00 CONSTIPATION, UNSPECIFIED: ICD-10-CM

## 2021-09-14 DIAGNOSIS — Z86.79 PERSONAL HISTORY OF OTHER DISEASES OF THE CIRCULATORY SYSTEM: ICD-10-CM

## 2021-09-14 DIAGNOSIS — Z98.890 OTHER SPECIFIED POSTPROCEDURAL STATES: Chronic | ICD-10-CM

## 2021-09-14 PROCEDURE — 99213 OFFICE O/P EST LOW 20 MIN: CPT

## 2021-09-14 RX ORDER — POTASSIUM CHLORIDE 1500 MG/1
20 TABLET, EXTENDED RELEASE ORAL
Qty: 30 | Refills: 0 | Status: DISCONTINUED | COMMUNITY
Start: 2021-07-27

## 2021-09-14 RX ORDER — ESTRADIOL 0.1 MG/G
0.1 CREAM VAGINAL
Qty: 1 | Refills: 3 | Status: DISCONTINUED | COMMUNITY
Start: 2020-11-06 | End: 2021-09-14

## 2021-09-14 RX ORDER — ATORVASTATIN CALCIUM 10 MG/1
10 TABLET, FILM COATED ORAL
Qty: 90 | Refills: 0 | Status: DISCONTINUED | COMMUNITY
Start: 2020-10-02

## 2021-09-14 RX ORDER — TRIAMTERENE AND HYDROCHLOROTHIAZIDE 25; 37.5 MG/1; MG/1
37.5-25 TABLET ORAL
Qty: 90 | Refills: 0 | Status: DISCONTINUED | COMMUNITY
Start: 2021-05-18

## 2021-09-14 RX ORDER — DISOPYRAMIDE PHOSPHATE 150 MG/1
150 CAPSULE, GELATIN COATED ORAL
Qty: 270 | Refills: 0 | Status: DISCONTINUED | COMMUNITY
Start: 2021-02-19

## 2021-09-14 RX ORDER — ATORVASTATIN CALCIUM 40 MG/1
40 TABLET, FILM COATED ORAL
Qty: 30 | Refills: 0 | Status: DISCONTINUED | COMMUNITY
Start: 2021-08-24

## 2021-09-14 RX ORDER — METRONIDAZOLE 7.5 MG/G
0.75 GEL VAGINAL
Qty: 70 | Refills: 0 | Status: DISCONTINUED | COMMUNITY
Start: 2021-05-19

## 2021-09-14 RX ORDER — METRONIDAZOLE 500 MG/1
500 TABLET ORAL TWICE DAILY
Qty: 14 | Refills: 0 | Status: DISCONTINUED | COMMUNITY
Start: 2020-12-17 | End: 2021-09-14

## 2021-09-14 RX ORDER — AMIODARONE HYDROCHLORIDE 200 MG/1
200 TABLET ORAL
Qty: 90 | Refills: 0 | Status: DISCONTINUED | COMMUNITY
Start: 2021-08-09

## 2021-09-14 RX ORDER — ASPIRIN 81 MG/1
81 TABLET, COATED ORAL
Qty: 30 | Refills: 0 | Status: DISCONTINUED | COMMUNITY
Start: 2021-07-27

## 2021-09-14 RX ORDER — TRIAMTERENE AND HYDROCHLOROTHIAZIDE 37.5; 25 MG/1; MG/1
37.5-25 CAPSULE ORAL
Refills: 0 | Status: DISCONTINUED | COMMUNITY
End: 2021-09-14

## 2021-09-14 RX ORDER — FUROSEMIDE 40 MG/1
40 TABLET ORAL
Qty: 30 | Refills: 0 | Status: DISCONTINUED | COMMUNITY
Start: 2021-07-27

## 2021-09-14 RX ORDER — IVERMECTIN 3 MG/1
3 TABLET ORAL
Qty: 4 | Refills: 0 | Status: DISCONTINUED | COMMUNITY
Start: 2021-07-19

## 2021-09-14 RX ORDER — AMIODARONE HYDROCHLORIDE 400 MG/1
400 TABLET ORAL
Qty: 20 | Refills: 0 | Status: DISCONTINUED | COMMUNITY
Start: 2021-07-27

## 2021-09-14 RX ORDER — SIMVASTATIN 40 MG/1
40 TABLET, FILM COATED ORAL
Refills: 0 | Status: DISCONTINUED | COMMUNITY
End: 2021-09-14

## 2021-09-14 RX ORDER — OXYCODONE 5 MG/1
5 TABLET ORAL
Qty: 20 | Refills: 0 | Status: DISCONTINUED | COMMUNITY
Start: 2021-07-27

## 2021-09-14 RX ORDER — METOPROLOL SUCCINATE 100 MG/1
100 TABLET, EXTENDED RELEASE ORAL
Qty: 90 | Refills: 0 | Status: DISCONTINUED | COMMUNITY
Start: 2020-10-30

## 2021-09-14 NOTE — COUNSELING
[FreeTextEntry1] : Please call to schedule UDS (bladder function test) when you are ready to consider prolapse surgery. \par \par Please follow up with your cardiologist. \par \par

## 2021-09-14 NOTE — HISTORY OF PRESENT ILLNESS
[FreeTextEntry1] : Patient is here for follow up on complete uterovaginal prolapse. \par Last seen on 6/15/21 for follow up. \par \par Pt is an LPN\par \par s/p cindy\par Pt is sexually active\par \par GH: 7 pB: 4 TVL: 8 C: 0 D: -5 Aa: +3 Ba: +7 Ap: -2 Bp:-2\par \par thin rectovaginal septum\par possible rectal prolapse\par \par PVR: 115cc (pt missed the hat)\par \par Estrace cream for atrophy\par Bowel recipe\par s/p Trospium 20mg BID: caused constipation\par \par Today pt states that she had open heart surgery 2 months ago 7/15/21 at [facility]. Has been having some issues and was told t hat she might need a pacemaker. She is continuing to follow up with her cardiologist. Pt is here today c/o on and off constipation. Also interested in prolapse surgery and is wondering if the surgery for uterine prolapse will help her constipation. She has used bowel recipe with some benefit, also uses metamucil and last BM was today. Denies any urinary complaints. Still is bothered by the prolapse and has her pessary with her but does not want it reinserted.

## 2021-09-14 NOTE — DISCUSSION/SUMMARY
[FreeTextEntry1] : Complete uterovaginal prolapse\par Pt advised when she is medically stable and ready for surgery to call us back to schedule UDS with reduction and surgical counseling with Dr Batista. \par UDS procedure explained to pt.\par \par Constipation\par Discussed bowel recipe, Miralax, MOM\par Patient voiced her understanding and agrees with the plan.\par \par Follow up prn.

## 2022-04-18 ENCOUNTER — INPATIENT (INPATIENT)
Facility: HOSPITAL | Age: 72
LOS: 1 days | Discharge: HOME | End: 2022-04-20
Attending: INTERNAL MEDICINE | Admitting: INTERNAL MEDICINE
Payer: MEDICARE

## 2022-04-18 VITALS
HEART RATE: 114 BPM | DIASTOLIC BLOOD PRESSURE: 83 MMHG | SYSTOLIC BLOOD PRESSURE: 147 MMHG | OXYGEN SATURATION: 90 % | RESPIRATION RATE: 18 BRPM

## 2022-04-18 DIAGNOSIS — Z98.890 OTHER SPECIFIED POSTPROCEDURAL STATES: Chronic | ICD-10-CM

## 2022-04-18 LAB
ALBUMIN SERPL ELPH-MCNC: 3.8 G/DL — SIGNIFICANT CHANGE UP (ref 3.5–5.2)
ALP SERPL-CCNC: 78 U/L — SIGNIFICANT CHANGE UP (ref 30–115)
ALT FLD-CCNC: 48 U/L — HIGH (ref 0–41)
ANION GAP SERPL CALC-SCNC: 23 MMOL/L — HIGH (ref 7–14)
AST SERPL-CCNC: 54 U/L — HIGH (ref 0–41)
BASE EXCESS BLDV CALC-SCNC: -13.7 MMOL/L — LOW (ref -2–3)
BASE EXCESS BLDV CALC-SCNC: -8.7 MMOL/L — LOW (ref -2–3)
BASOPHILS # BLD AUTO: 0.12 K/UL — SIGNIFICANT CHANGE UP (ref 0–0.2)
BASOPHILS NFR BLD AUTO: 0.9 % — SIGNIFICANT CHANGE UP (ref 0–1)
BILIRUB SERPL-MCNC: 0.4 MG/DL — SIGNIFICANT CHANGE UP (ref 0.2–1.2)
BUN SERPL-MCNC: 26 MG/DL — HIGH (ref 10–20)
CA-I SERPL-SCNC: 1.32 MMOL/L — SIGNIFICANT CHANGE UP (ref 1.15–1.33)
CA-I SERPL-SCNC: 2.02 MMOL/L — CRITICAL HIGH (ref 1.15–1.33)
CALCIUM SERPL-MCNC: 9.8 MG/DL — SIGNIFICANT CHANGE UP (ref 8.5–10.1)
CHLORIDE SERPL-SCNC: 103 MMOL/L — SIGNIFICANT CHANGE UP (ref 98–110)
CO2 SERPL-SCNC: 16 MMOL/L — LOW (ref 17–32)
CREAT SERPL-MCNC: 0.9 MG/DL — SIGNIFICANT CHANGE UP (ref 0.7–1.5)
EGFR: 68 ML/MIN/1.73M2 — SIGNIFICANT CHANGE UP
EOSINOPHIL # BLD AUTO: 0.26 K/UL — SIGNIFICANT CHANGE UP (ref 0–0.7)
EOSINOPHIL NFR BLD AUTO: 2 % — SIGNIFICANT CHANGE UP (ref 0–8)
GAS PNL BLDA: SIGNIFICANT CHANGE UP
GAS PNL BLDV: 137 MMOL/L — SIGNIFICANT CHANGE UP (ref 136–145)
GAS PNL BLDV: 139 MMOL/L — SIGNIFICANT CHANGE UP (ref 136–145)
GAS PNL BLDV: SIGNIFICANT CHANGE UP
GAS PNL BLDV: SIGNIFICANT CHANGE UP
GLUCOSE SERPL-MCNC: 197 MG/DL — HIGH (ref 70–99)
HCO3 BLDV-SCNC: 20 MMOL/L — LOW (ref 22–29)
HCO3 BLDV-SCNC: 20 MMOL/L — LOW (ref 22–29)
HCT VFR BLD CALC: 43.2 % — SIGNIFICANT CHANGE UP (ref 37–47)
HCT VFR BLDA CALC: 34 % — LOW (ref 39–51)
HCT VFR BLDA CALC: 42 % — SIGNIFICANT CHANGE UP (ref 39–51)
HGB BLD CALC-MCNC: 11.4 G/DL — LOW (ref 12.6–17.4)
HGB BLD CALC-MCNC: 13.9 G/DL — SIGNIFICANT CHANGE UP (ref 12.6–17.4)
HGB BLD-MCNC: 13.5 G/DL — SIGNIFICANT CHANGE UP (ref 12–16)
IMM GRANULOCYTES NFR BLD AUTO: 0.4 % — HIGH (ref 0.1–0.3)
LACTATE BLDV-MCNC: 13.8 MMOL/L — CRITICAL HIGH (ref 0.5–2)
LACTATE BLDV-MCNC: 3.1 MMOL/L — HIGH (ref 0.5–2)
LACTATE SERPL-SCNC: 1.5 MMOL/L — SIGNIFICANT CHANGE UP (ref 0.7–2)
LYMPHOCYTES # BLD AUTO: 46.1 % — SIGNIFICANT CHANGE UP (ref 20.5–51.1)
LYMPHOCYTES # BLD AUTO: 5.88 K/UL — HIGH (ref 1.2–3.4)
MAGNESIUM SERPL-MCNC: 2.1 MG/DL — SIGNIFICANT CHANGE UP (ref 1.8–2.4)
MCHC RBC-ENTMCNC: 30.1 PG — SIGNIFICANT CHANGE UP (ref 27–31)
MCHC RBC-ENTMCNC: 31.3 G/DL — LOW (ref 32–37)
MCV RBC AUTO: 96.4 FL — SIGNIFICANT CHANGE UP (ref 81–99)
MONOCYTES # BLD AUTO: 0.71 K/UL — HIGH (ref 0.1–0.6)
MONOCYTES NFR BLD AUTO: 5.6 % — SIGNIFICANT CHANGE UP (ref 1.7–9.3)
NEUTROPHILS # BLD AUTO: 5.73 K/UL — SIGNIFICANT CHANGE UP (ref 1.4–6.5)
NEUTROPHILS NFR BLD AUTO: 45 % — SIGNIFICANT CHANGE UP (ref 42.2–75.2)
NRBC # BLD: 0 /100 WBCS — SIGNIFICANT CHANGE UP (ref 0–0)
PCO2 BLDV: 51 MMHG — HIGH (ref 39–42)
PCO2 BLDV: 85 MMHG — HIGH (ref 39–42)
PH BLDV: 6.97 — LOW (ref 7.32–7.43)
PH BLDV: 7.19 — LOW (ref 7.32–7.43)
PLATELET # BLD AUTO: 360 K/UL — SIGNIFICANT CHANGE UP (ref 130–400)
PO2 BLDV: 25 MMHG — SIGNIFICANT CHANGE UP
PO2 BLDV: 49 MMHG — SIGNIFICANT CHANGE UP
POTASSIUM BLDV-SCNC: 2.5 MMOL/L — CRITICAL LOW (ref 3.5–5.1)
POTASSIUM BLDV-SCNC: 4.9 MMOL/L — SIGNIFICANT CHANGE UP (ref 3.5–5.1)
POTASSIUM SERPL-MCNC: 4.8 MMOL/L — SIGNIFICANT CHANGE UP (ref 3.5–5)
POTASSIUM SERPL-SCNC: 4.8 MMOL/L — SIGNIFICANT CHANGE UP (ref 3.5–5)
PROT SERPL-MCNC: 6.1 G/DL — SIGNIFICANT CHANGE UP (ref 6–8)
RBC # BLD: 4.48 M/UL — SIGNIFICANT CHANGE UP (ref 4.2–5.4)
RBC # FLD: 13.2 % — SIGNIFICANT CHANGE UP (ref 11.5–14.5)
SAO2 % BLDV: 18.6 % — SIGNIFICANT CHANGE UP
SAO2 % BLDV: 70.3 % — SIGNIFICANT CHANGE UP
SARS-COV-2 RNA SPEC QL NAA+PROBE: SIGNIFICANT CHANGE UP
SODIUM SERPL-SCNC: 142 MMOL/L — SIGNIFICANT CHANGE UP (ref 135–146)
TROPONIN T SERPL-MCNC: 0.13 NG/ML — CRITICAL HIGH
TROPONIN T SERPL-MCNC: <0.01 NG/ML — SIGNIFICANT CHANGE UP
WBC # BLD: 12.75 K/UL — HIGH (ref 4.8–10.8)
WBC # FLD AUTO: 12.75 K/UL — HIGH (ref 4.8–10.8)

## 2022-04-18 PROCEDURE — 71045 X-RAY EXAM CHEST 1 VIEW: CPT | Mod: 26,76

## 2022-04-18 PROCEDURE — 99291 CRITICAL CARE FIRST HOUR: CPT | Mod: FT,25,GC

## 2022-04-18 PROCEDURE — 31500 INSERT EMERGENCY AIRWAY: CPT | Mod: GC

## 2022-04-18 PROCEDURE — 99223 1ST HOSP IP/OBS HIGH 75: CPT | Mod: 57

## 2022-04-18 PROCEDURE — 93459 L HRT ART/GRFT ANGIO: CPT | Mod: 26

## 2022-04-18 PROCEDURE — 99222 1ST HOSP IP/OBS MODERATE 55: CPT

## 2022-04-18 PROCEDURE — 71045 X-RAY EXAM CHEST 1 VIEW: CPT | Mod: 26,77

## 2022-04-18 PROCEDURE — 93010 ELECTROCARDIOGRAM REPORT: CPT

## 2022-04-18 PROCEDURE — 93306 TTE W/DOPPLER COMPLETE: CPT | Mod: 26

## 2022-04-18 PROCEDURE — 99222 1ST HOSP IP/OBS MODERATE 55: CPT | Mod: 57

## 2022-04-18 PROCEDURE — 93286 PERI-PX EVAL PM/LDLS PM IP: CPT | Mod: 26

## 2022-04-18 RX ORDER — CEFAZOLIN SODIUM 1 G
1000 VIAL (EA) INJECTION ONCE
Refills: 0 | Status: COMPLETED | OUTPATIENT
Start: 2022-04-18 | End: 2022-04-18

## 2022-04-18 RX ORDER — CHLORHEXIDINE GLUCONATE 213 G/1000ML
15 SOLUTION TOPICAL EVERY 12 HOURS
Refills: 0 | Status: DISCONTINUED | OUTPATIENT
Start: 2022-04-18 | End: 2022-04-18

## 2022-04-18 RX ORDER — ATORVASTATIN CALCIUM 80 MG/1
80 TABLET, FILM COATED ORAL AT BEDTIME
Refills: 0 | Status: DISCONTINUED | OUTPATIENT
Start: 2022-04-18 | End: 2022-04-19

## 2022-04-18 RX ORDER — HEPARIN SODIUM 5000 [USP'U]/ML
5000 INJECTION INTRAVENOUS; SUBCUTANEOUS EVERY 12 HOURS
Refills: 0 | Status: DISCONTINUED | OUTPATIENT
Start: 2022-04-18 | End: 2022-04-19

## 2022-04-18 RX ORDER — OXYCODONE AND ACETAMINOPHEN 5; 325 MG/1; MG/1
2 TABLET ORAL EVERY 8 HOURS
Refills: 0 | Status: DISCONTINUED | OUTPATIENT
Start: 2022-04-18 | End: 2022-04-18

## 2022-04-18 RX ORDER — SODIUM CHLORIDE 9 MG/ML
1000 INJECTION INTRAMUSCULAR; INTRAVENOUS; SUBCUTANEOUS
Refills: 0 | Status: DISCONTINUED | OUTPATIENT
Start: 2022-04-18 | End: 2022-04-19

## 2022-04-18 RX ORDER — OXYCODONE HYDROCHLORIDE 5 MG/1
15 TABLET ORAL EVERY 12 HOURS
Refills: 0 | Status: DISCONTINUED | OUTPATIENT
Start: 2022-04-18 | End: 2022-04-18

## 2022-04-18 RX ORDER — SODIUM CHLORIDE 9 MG/ML
1000 INJECTION, SOLUTION INTRAVENOUS ONCE
Refills: 0 | Status: DISCONTINUED | OUTPATIENT
Start: 2022-04-18 | End: 2022-04-19

## 2022-04-18 RX ORDER — ASPIRIN/CALCIUM CARB/MAGNESIUM 324 MG
81 TABLET ORAL DAILY
Refills: 0 | Status: DISCONTINUED | OUTPATIENT
Start: 2022-04-18 | End: 2022-04-19

## 2022-04-18 RX ORDER — PANTOPRAZOLE SODIUM 20 MG/1
40 TABLET, DELAYED RELEASE ORAL DAILY
Refills: 0 | Status: DISCONTINUED | OUTPATIENT
Start: 2022-04-19 | End: 2022-04-19

## 2022-04-18 RX ORDER — LABETALOL HCL 100 MG
10 TABLET ORAL ONCE
Refills: 0 | Status: DISCONTINUED | OUTPATIENT
Start: 2022-04-18 | End: 2022-04-18

## 2022-04-18 RX ORDER — AMLODIPINE BESYLATE 2.5 MG/1
10 TABLET ORAL ONCE
Refills: 0 | Status: DISCONTINUED | OUTPATIENT
Start: 2022-04-18 | End: 2022-04-18

## 2022-04-18 RX ORDER — LOSARTAN POTASSIUM 100 MG/1
50 TABLET, FILM COATED ORAL DAILY
Refills: 0 | Status: DISCONTINUED | OUTPATIENT
Start: 2022-04-18 | End: 2022-04-18

## 2022-04-18 RX ORDER — ACETAMINOPHEN 500 MG
650 TABLET ORAL EVERY 6 HOURS
Refills: 0 | Status: DISCONTINUED | OUTPATIENT
Start: 2022-04-18 | End: 2022-04-19

## 2022-04-18 RX ORDER — FENTANYL CITRATE 50 UG/ML
0.5 INJECTION INTRAVENOUS
Qty: 2500 | Refills: 0 | Status: DISCONTINUED | OUTPATIENT
Start: 2022-04-18 | End: 2022-04-19

## 2022-04-18 RX ORDER — DEXMEDETOMIDINE HYDROCHLORIDE IN 0.9% SODIUM CHLORIDE 4 UG/ML
0.05 INJECTION INTRAVENOUS
Qty: 400 | Refills: 0 | Status: DISCONTINUED | OUTPATIENT
Start: 2022-04-18 | End: 2022-04-19

## 2022-04-18 RX ADMIN — Medication 81 MILLIGRAM(S): at 18:54

## 2022-04-18 RX ADMIN — SODIUM CHLORIDE 150 MILLILITER(S): 9 INJECTION INTRAMUSCULAR; INTRAVENOUS; SUBCUTANEOUS at 19:01

## 2022-04-18 RX ADMIN — HEPARIN SODIUM 5000 UNIT(S): 5000 INJECTION INTRAVENOUS; SUBCUTANEOUS at 18:54

## 2022-04-18 RX ADMIN — ATORVASTATIN CALCIUM 80 MILLIGRAM(S): 80 TABLET, FILM COATED ORAL at 21:28

## 2022-04-18 RX ADMIN — Medication 100 MILLIGRAM(S): at 13:00

## 2022-04-18 RX ADMIN — DEXMEDETOMIDINE HYDROCHLORIDE IN 0.9% SODIUM CHLORIDE 0.75 MICROGRAM(S)/KG/HR: 4 INJECTION INTRAVENOUS at 13:32

## 2022-04-18 NOTE — CHART NOTE - NSCHARTNOTEFT_GEN_A_CORE
PRE-OP DIAGNOSIS:    complete heart block, fatigue, dizziness    PROCEDURE:     [x] Coronary Angiogram     [x] LHC     [] LVG     [] RHC     [x] Temporary Transvenous Pacemaker Placement    [] Intervention (see below)         PHYSICIAN:  Dr. White    ASSISTANT:  Dr. Lozada, Dr. Joaquin       PROCEDURE DESCRIPTION:     Consent:      [x] Patient     [] Family Member     []  Used        Anesthesia:     [] General     [x] Sedation     [x] Local        Access & Closure:     [] Fr Radial Artery     [x] 6 Fr right Femoral Artery (manual)    [x] 6 Fr right Femoral Vein (sutured in place)    [] Fr Brachial Vein       IV Contrast: 40 mL        Intervention: none    Implants:   - successful placement of temporary transvenous pacemaker placement via right femoral vein approach       FINDINGS:     Coronary Dominance: right dominant    LM: mild disease    LAD: severe atherosclerosis, aneurysm of mid LAD, 100% stenosis of mid LAD distally, distal LAD supplied by patent LIMA to LAD    LCX: mild disease  OM1: large sized, minor luminal irregularities    RCA: 100% occluded at the ostium    RPDA: minor luminal irregularities, supplied by patent SVG to RPDA      LIMA to distal LAD is patent    SVG to RPDA is patent       LVEDP: 19 mmHg        ESTIMATED BLOOD LOSS: < 10 mL        CONDITION:     [] Good     [] Fair     [x] Critical        SPECIMEN REMOVED: N/A       POST-OP DIAGNOSIS:      [] Normal Coronary Angiogram     [] Mild Coronary Artery Disease (< 50% stenosis)     [x] Known 2 Vessel Coronary Artery Disease s/p CABG x 2, with patent LIMA to LAD, and SVG to RPDA       PLAN OF CARE:       [x] Return to In-patient bed CCU    [x] Medications:   - continue wit ASA, and statin, and stop beta-blockers    [x] IV Fluids: NS@150cc/hr x 10 hours    [x][ EPS consultation for placement of PPM    [x] Vent and acid base management as per CCU PRE-OP DIAGNOSIS:    complete heart block, fatigue, dizziness, STEMI Code    PROCEDURE:     [x] Coronary Angiogram     [x] LHC     [] LVG     [] RHC     [x] Temporary Transvenous Pacemaker Placement    [] Intervention (see below)         PHYSICIAN:  Dr. White    ASSISTANT:  Dr. Lozada, Dr. Joaquin       PROCEDURE DESCRIPTION:     Consent:      [x] Patient     [] Family Member     []  Used        Anesthesia:     [] General     [x] Sedation     [x] Local        Access & Closure:     [] Fr Radial Artery     [x] 6 Fr right Femoral Artery (manual)    [x] 6 Fr right Femoral Vein (sutured in place)    [] Fr Brachial Vein       IV Contrast: 40 mL        Intervention: none    Implants:   - successful placement of temporary transvenous pacemaker placement via right femoral vein approach       FINDINGS:     Coronary Dominance: right dominant    LM: mild disease    LAD: severe atherosclerosis, aneurysm of mid LAD, 100% stenosis of mid LAD distally, distal LAD supplied by patent LIMA to LAD    LCX: mild disease  OM1: large sized, minor luminal irregularities    RCA: 100% occluded at the ostium    RPDA: minor luminal irregularities, supplied by patent SVG to RPDA      LIMA to distal LAD is patent    SVG to RPDA is patent       LVEDP: 19 mmHg        ESTIMATED BLOOD LOSS: < 10 mL        CONDITION:     [] Good     [] Fair     [x] Critical        SPECIMEN REMOVED: N/A       POST-OP DIAGNOSIS:      [] Normal Coronary Angiogram     [] Mild Coronary Artery Disease (< 50% stenosis)     [x] Known 2 Vessel Coronary Artery Disease s/p CABG x 2, with patent LIMA to LAD, and SVG to RPDA       PLAN OF CARE:       [x] Return to In-patient bed CCU    [x] Medications:   - continue wit ASA, and statin, and stop beta-blockers    [x] IV Fluids: NS@150cc/hr x 10 hours    [x][ EPS consultation for placement of PPM    [x] Vent and acid base management as per CCU

## 2022-04-18 NOTE — ED PROVIDER NOTE - ATTENDING CONTRIBUTION TO CARE
I personally evaluated the patient. I reviewed the Resident’s or Physician Assistant’s note (as assigned above), and agree with the findings and plan except as documented in my note.  72-year-old female past medical history significant for hypertension, DLD, CAD, status post multiple PCI, status post CABG 6 months ago at [facility] brought to the ED from home with dizziness and nausea.  Patient unable to give history due to decreased responsiveness.  As per EMS, patient had been complaining of nausea and dizziness x2 days.  Patient reported "I need a pacemaker."  As per EMS patient was pale and diaphoretic.  Patient noticed with sent to be in with severe bradycardia atropine given without response and transcutaneous pacing started.  Patient given Valium and fentanyl as sedation for transcutaneous pacing.  Vitals noted.   CONSTITUTIONAL: Moaning in response to painful stimuli.   HEAD: Normocephalic; atraumatic.   EYES: PERRL; EOM intact. Conjunctiva normal B/L.   ENT: Normal pharynx with no tonsillar hypertrophy. MMM. No JVD.   NECK: Supple; non-tender; no cervical lymphadenopathy.   CHEST: Normal chest excursion with respiration.   CARDIOVASCULAR: Transcutaneous pacing in progress  RESPIRATORY: Normal chest excursion with respiration; breath sounds clear and equal bilaterally; no wheezes, rhonchi, or rales.  GI/: Normal bowel sounds; non-distended; non-tender.  BACK: No evidence of trauma or deformity. Non-tender to palpation. No CVA tenderness.   EXT: Normal ROM in all four extremities; non-tender to palpation; distal pulses are normal. No leg edema B/L.   SKIN: Normal for age and race; warm; dry; good turgor.  NEURO: A & O x 4; CN 2-12 intact. Grossly unremarkable.

## 2022-04-18 NOTE — ED PROCEDURE NOTE - CPROC ED INFORMED CONSENT1
Nephrology Progress Note      Monie Jackson is a 58year old male.     HPI:   Patient presents with:  Chronic Kidney Disease  Hypertension      Mr. Jamal Cotter was seen in the nephrology clinic today in follow-up for management of chronic kidney disease
3.6 oz      Types: 6 Standard drinks or equivalent per week      Frequency: Never    Drug use: No       Medications (Active prior to today's visit):    Current Outpatient Medications:  tamsulosin HCl (FLOMAX) 0.4 MG Oral Cap Take 1 capsule (0.4 mg total) b
thyromegaly  Cardiac: Regular rate and rhythm, S1, S2 normal, no murmur or rub  Lungs: Clear without wheezes, rales, rhonchi. Extremities: Without clubbing, cyanosis or edema.   Neurologic: Alert and oriented, normal affect, cranial nerves grossly intact
Negative 01/14/2019    PHURINE 5.0 01/14/2019    PROUR Negative 01/14/2019    UROBILINOGEN <2.0 01/14/2019    NITRITE Negative 01/14/2019    LEUUR Negative 01/14/2019    NMIC Microscopic not indicated 01/14/2019     ASSESSMENT/PLAN:     #1.  Chronic kidney
This was an emergent procedure and consent was implied.
This was an emergent procedure and consent was implied.

## 2022-04-18 NOTE — ED PROVIDER NOTE - CARE PLAN
Principal Discharge DX:	Complete heart block  Secondary Diagnosis:	STEMI (ST elevation myocardial infarction)   1

## 2022-04-18 NOTE — PATIENT PROFILE ADULT - FUNCTIONAL SCREEN CURRENT LEVEL: SWALLOWING (IF SCORE 2 OR MORE FOR ANY ITEM, CONSULT REHAB SERVICES), MLM)
patient intubated and sedated at this time, unable to respond/2 = difficulty swallowing liquids/foods

## 2022-04-18 NOTE — PATIENT PROFILE ADULT - INFORMATION COULD NOT BE OBTAINED DETAILS
patient intubated and sedated at this time, unable to respond  patient intubated and sedated at this time, unable to respond patient intubated and sedated at this time, unable to respond

## 2022-04-18 NOTE — CONSULT NOTE ADULT - ASSESSMENT
IMPRESSION:  - CAD with prior PCIs and CABG 8 months ago in [place]  - HTN, osteoporosis  - Complete heart block with ventricular escape rhythm  - Possible inferior STEMI (Inf Q waves with ST elevations)  - Elevated lactate to 13 with metabolic acidosis PH 6.9 - cardiogenic shock and hypoperfusion secondary to profound bradycardia    PLAN:  - Admit to CC  - urgent cath today  - Continue aspirin and plavix  - Continue statin therapy  - Keep TVP  - EP evaluation  - 2d echo  - serial troponins  - serial lactate levels  - Further recommendations after cath   IMPRESSION:  - CAD with prior PCIs and CABG 8 months ago in [place]  - HTN, osteoporosis  - Complete heart block with ventricular escape rhythm  - Possible inferior STEMI (Inf Q waves with ST elevations)  - Elevated lactate to 13 with metabolic acidosis PH 6.9 - cardiogenic shock and hypoperfusion secondary to profound bradycardia    PLAN:  - Admit to CCu  - urgent cath today  - Continue aspirin and plavix  - Continue statin therapy  - Keep TVP  - EP evaluation  - 2d echo  - serial troponins  - serial lactate levels  - Further recommendations after cath

## 2022-04-18 NOTE — H&P ADULT - ASSESSMENT
IMPRESSION:   # complete heart block  # STEMI?   # HAGMA with lactic acidosis   # cardiogenic shock   # hx of CAD  # hx of HTN      PLAN:    CNS: intubated and sedated for airway protection     HEENT:  Oral care.     PULMONARY:  HOB @ 45 degree. Pulmonary toilet   Vent settings:     CARDIOVASCULAR: STEMI with complete heart block and cardiogenic shock  - Asa/plavix  - statin  - echo  - continue TVP and follow up with EP  - hold on BB for now  - add ace-i/ARB if BP tolerates.   - trend trops until downtrending     GI: GI prophylaxis.  NGT.                        RENAL:  HAGMA and lactic acidosis  - trend lactate     INFECTIOUS DISEASE: monitor off antibiotics    HEMATOLOGICAL:  DVT prophylaxis.     ENDOCRINE:  Follow up FS.  Insulin protocol if needed.    DISPOSITION: CCU         IMPRESSION:   # complete heart block  # cardiogenic shock secondary CHB  # HAGMA with lactic acidosis   # hx of CAD  # hx of HTN      PLAN:    CNS: intubated and sedated for airway protection     HEENT:  Oral care.     PULMONARY:  HOB @ 45 degree. Pulmonary toilet   Vent settings:     CARDIOVASCULAR: complete heart block and cardiogenic shock  Non-obstructive CAD on cath   - Asa, statin  - echo  - continue TVP and follow up with EP  - hold on BB   - add ace-i/ARB if BP tolerates.     GI: GI prophylaxis.  NGT.                        RENAL:  HAGMA and lactic acidosis  - trend lactate     INFECTIOUS DISEASE: monitor off antibiotics    HEMATOLOGICAL:  DVT prophylaxis.     ENDOCRINE:  Follow up FS.  Insulin protocol if needed.    DISPOSITION: CCU       IMPRESSION:   # complete heart block  # cardiogenic shock secondary CHB  # HAGMA with lactic acidosis   # hx of CAD  # hx of HTN      PLAN:    CNS: intubated and sedated for airway protection (fentanyl and precedex)    HEENT:  Oral care.     PULMONARY:  HOB @ 45 degree. Pulmonary toilet   Vent settings:     CARDIOVASCULAR: complete heart block and cardiogenic shock  Non-obstructive CAD on cath   - Asa, statin  - echo  - continue TVP and follow up with EP for PPM  - hold on BB   - add ace-i/ARB if BP tolerates.     GI: GI prophylaxis.  OG tube feeds.                        RENAL:  HAGMA and lactic acidosis  - trend lactate     INFECTIOUS DISEASE: monitor off antibiotics    HEMATOLOGICAL:  DVT prophylaxis.     ENDOCRINE:  Follow up FS.  Insulin protocol if needed.    DISPOSITION: CCU  Family to fax over medications        IMPRESSION:   # complete heart block  # cardiogenic shock secondary CHB  # HAGMA with lactic acidosis   # hx of CAD  # hx of HTN      PLAN:    CNS: intubated and sedated for airway protection (fentanyl and precedex)    HEENT:  Oral care.     PULMONARY:  HOB @ 45 degree. Pulmonary toilet   Vent settings: as per pulmo    CARDIOVASCULAR: complete heart block and cardiogenic shock  Non-obstructive CAD on cath   - Asa, statin  - echo  - continue TVP and follow up with EP for PPM  - hold on BB   - add ace-i/ARB if BP tolerates.   - start losartan and amlodipine     GI: GI prophylaxis.  OG tube feeds.                        RENAL:  HAGMA and lactic acidosis  - trend lactate     INFECTIOUS DISEASE: monitor off antibiotics    HEMATOLOGICAL:  DVT prophylaxis.     ENDOCRINE:  Follow up FS.  Insulin protocol if needed.    DISPOSITION: CCU  Family to fax over medications, medrec not complete

## 2022-04-18 NOTE — AIRWAY REMOVAL NOTE  ADULT & PEDS - ARTIFICAL AIRWAY REMOVAL COMMENTS
Pt was extubated to A/M 40% as per MD's order, tolerating well. No signs of respiratory distress or stridor noted. Will continue to monitor.

## 2022-04-18 NOTE — ED PROVIDER NOTE - PHYSICAL EXAMINATION
Vital Signs: Reviewed  GEN: moans, minimally responsive to painful stimuli  HEAD:  normocephalic, atraumatic  EYES:  PERRLA; conjunctivae without injection, drainage or discharge  ENMT:  nasal mucosa moist; mouth moist without ulcerations or lesions; throat moist without erythema, exudate, ulcerations or lesions  NECK:  supple  CARDIAC:  bradycardic  RESP:  respiratory rate and effort appear normal for age; lungs are clear to auscultation bilaterally; no rales or wheezes  ABDOMEN:  soft, nontender, nondistended  MUSCULOSKELETAL/NEURO:  non-purposeful movements of all four extremities  SKIN:  cool and dry

## 2022-04-18 NOTE — H&P ADULT - ATTENDING COMMENTS
Patient seen, case d/w CCU team on rounds.  Patient with h/o CAD, CABG (patent grafts today) presented with severe bradycardia due to complete heart block and cardiogenic shock. Stabilized with TVP.  Intubated for airway protection. Awake, alert now. Will start SBT and extubate if passes.  Awaiting full medical records from family.  PPM prior to discharge.

## 2022-04-18 NOTE — ED PROVIDER NOTE - PROGRESS NOTE DETAILS
Patient responsive to painful stimuli on arrival.  Intubated on arrival.  TVP placed.  EP at bedside during procedure.  Good capture at 30 cm.  Cardiology at bedside, STEMI activated.  Dr. White responded.  Patient to Cath Lab emergently and admitted to CCU.

## 2022-04-18 NOTE — PATIENT PROFILE ADULT - FALL HARM RISK - HARM RISK INTERVENTIONS

## 2022-04-18 NOTE — ED PROVIDER NOTE - OBJECTIVE STATEMENT
h/o HTN, CAD s/p stents (cardiology in [facility]),   recent heart surgery 7 mos ago in [place], 72 year old woman, BIBEMS altered and somnolent. Patient      h/o HTN, CAD s/p stents and CABG 6 months ago (cardiology in [facility]) on ?DAPT and/or ?AC. Patient recently returned from [place] 3 days ago (wasa residing there for 6 months, travels back and forth seasonally).     recent heart surgery 7 mos ago in [place],       NKDA  Former smoker 72 year old woman, BIBEMS altered and somnolent. Prior to arrival, as per EMS, patient was nauseous and dizzy, repeating that she needed a pacemaker. She was bradycardic down to the 30s - prompting percutaneous pacing by EMS, who then gave her valium and fentanyl for comfort. On arrival, very somnolent, groaning and moaning - unclear if secondary to medications or due to inadequate perfusion. Full HPI is limited secondary to emergent need for intervention.   Further history partially obtained later by telephone from son Aayush: h/o HTN, CAD s/p stents and CABG 6 months ago (cardiology in [facility]) on ?DAPT and/or ?AC. Patient recently returned from [place] 3 days ago (wasa residing there for 6 months, travels back and forth seasonally). She also had a heart surgery 7 mos ago in [place]. NKDA. Former smoker.    Patient intubated in the ED, in parallel with TVP placement. Code STEMI called shortly after, and patient was taken to the cath lab.

## 2022-04-18 NOTE — CONSULT NOTE ADULT - ASSESSMENT
IMPRESSION:  - CAD with prior PCIs and CABG 8 months ago in [place]  - HTN, osteoporosis  - Complete heart block with ventricular escape rhythm  - Possible inferior STEMI (Inf Q waves with ST elevations)  - Elevated lactate to 13 with metabolic acidosis PH 6.9 - cardiogenic shock and hypoperfusion secondary to profound bradycardia    PLAN:  - Admit to CCu  - urgent cath today with cardiology  - Continue aspirin and plavix  - Continue statin therapy  - Keep TVP  - Avoid AV karin blocking agents  - 2d echo  - serial troponins  - serial lactate levels  - will monitor heart rhythm after revascularization and decide on pacemaker and further recs   IMPRESSION:  - CAD with prior PCIs and CABG 8 months ago in [place]  - HTN, osteoporosis  - Complete heart block with ventricular escape rhythm  - Possible inferior STEMI (Inf Q waves with ST elevations)  - Elevated lactate to 13 with metabolic acidosis PH 6.9 - cardiogenic shock and hypoperfusion secondary to profound bradycardia    PLAN:  - Admit to CCu  - urgent cath today with cardiology - negative  - Continue aspirin and plavix  - Continue statin therapy  - Keep TVP  - Avoid AV karin blocking agents  - 2d echo  - serial troponins  - serial lactate levels  - Keep NPO after midnight  - Planned for permanent pacemaker insertion tomorrow

## 2022-04-18 NOTE — H&P ADULT - NSHPPHYSICALEXAM_GEN_ALL_CORE
Physical Exam:  General: NAD,   Neurology: intubated  Eyes: PERRLA/ EOMI  ENT/Neck: Neck supple, trachea midline, No JVD  Respiratory: B/L basilar rales, No wheezing, rhonchi  Cardiovascular: Normal rate regular rhythm, S1S2, no murmurs, rubs or gallops  Abdominal: Soft, non-tender, non-distended  Extremities: no pedal edema, + peripheral pulses  Musculoskeletal: 5/5 BUE and BLE muscle strength  Skin: No Rashes, Hematoma, Ecchymosis Physical Exam:  General: NAD,   Neurology: intubated  Eyes: PERRLA/ EOMI  ENT/Neck: Neck supple, trachea midline, No JVD  Respiratory: B/L basilar rales, No wheezing, rhonchi  Cardiovascular: Normal rate regular rhythm, S1S2, no murmurs, rubs or gallops  Abdominal: Soft, non-tender, non-distended  Extremities: no pedal edema, + peripheral pulses  Musculoskeletal: unable to evaluate   Skin: No Rashes, Hematoma, Ecchymosis

## 2022-04-18 NOTE — H&P ADULT - HISTORY OF PRESENT ILLNESS
72 year old female with a PMHx of HTN, osteoporosis, CAD s/p multiple PCIs and CABG around 8 months ago in [place]    CC: dizziness    History goes back to: day of admission when patient was at home, started feeling dizzy, lightheaded and sweaty, called EMS, found her bradycardic to 30 with complete AV block.  She did not respond to atropine, so started pacing.   In the ED patient was obtunded and, and in complete heart block. She was intubated and transvenous pacer was inserted.   Initial strip showing escape rhythm at 30bpm, with possible inferior ST elevations.    Patient seen by cardiology and taken to cardiac cath.  72 year old female with a PMHx of HTN, osteoporosis, CAD s/p multiple PCIs and CABG around 8 months ago in [place]    CC: dizziness    History goes back to: day of admission when patient was at home, started feeling dizzy, lightheaded and sweaty, called EMS, found her bradycardic to 30 with complete AV block.  She did not respond to atropine, so started pacing.   In the ED patient was obtunded and, and in complete heart block. She was intubated and transvenous pacer was inserted.   Initial strip showing escape rhythm at 30bpm, with possible inferior ST elevations?    Patient seen by cardiology and taken to cardiac cath.   LM: mild disease  LAD: severe atherosclerosis, aneurysm of mid LAD, 100% stenosis of mid LAD distally, distal LAD supplied by patent LIMA to LAD  LCX: mild disease  OM1: large sized, minor luminal irregularities  RCA: 100% occluded at the ostium

## 2022-04-18 NOTE — ED PROVIDER NOTE - CLINICAL SUMMARY MEDICAL DECISION MAKING FREE TEXT BOX
72-year-old female past medical history as documented brought to the ED with bradycardia.  Patient noted to be bradycardic on scene and transcutaneous pacing started.  Patient given sedation for transcutaneous continuous pacing.  Patient noted to be poorly responsive on arrival.  Patient in complete heart block.  TVP placed.  Patient intubated.  EP and cardiology consulted.  STEMI code activated. Patient admitted to CCU and to Cath Lab for emergent cardiac cath.

## 2022-04-18 NOTE — PATIENT PROFILE ADULT - FUNCTIONAL SCREEN CURRENT LEVEL: COMMUNICATION, MLM
patient intubated and sedated at this time, unable to respond/3 = unable to understand or speak (not related to language barrier)

## 2022-04-18 NOTE — CONSULT NOTE ADULT - ATTENDING COMMENTS
CHB, s/p TVP  h/o cad/pci/cabg  cath - 2v cad, patent grafts  admit to ccu  med mx for cad  EP eval for PPM

## 2022-04-19 LAB
A1C WITH ESTIMATED AVERAGE GLUCOSE RESULT: 5.7 % — HIGH (ref 4–5.6)
ALBUMIN SERPL ELPH-MCNC: 3.7 G/DL — SIGNIFICANT CHANGE UP (ref 3.5–5.2)
ALP SERPL-CCNC: 84 U/L — SIGNIFICANT CHANGE UP (ref 30–115)
ALT FLD-CCNC: 122 U/L — HIGH (ref 0–41)
ANION GAP SERPL CALC-SCNC: 10 MMOL/L — SIGNIFICANT CHANGE UP (ref 7–14)
AST SERPL-CCNC: 98 U/L — HIGH (ref 0–41)
BASE EXCESS BLDA CALC-SCNC: 2.3 MMOL/L — SIGNIFICANT CHANGE UP (ref -2–3)
BILIRUB SERPL-MCNC: 0.9 MG/DL — SIGNIFICANT CHANGE UP (ref 0.2–1.2)
BUN SERPL-MCNC: 24 MG/DL — HIGH (ref 10–20)
CALCIUM SERPL-MCNC: 11.4 MG/DL — HIGH (ref 8.5–10.1)
CHLORIDE SERPL-SCNC: 105 MMOL/L — SIGNIFICANT CHANGE UP (ref 98–110)
CHOLEST SERPL-MCNC: 156 MG/DL — SIGNIFICANT CHANGE UP
CO2 SERPL-SCNC: 26 MMOL/L — SIGNIFICANT CHANGE UP (ref 17–32)
CREAT SERPL-MCNC: 0.8 MG/DL — SIGNIFICANT CHANGE UP (ref 0.7–1.5)
EGFR: 78 ML/MIN/1.73M2 — SIGNIFICANT CHANGE UP
ESTIMATED AVERAGE GLUCOSE: 117 MG/DL — HIGH (ref 68–114)
GAS PNL BLDA: SIGNIFICANT CHANGE UP
GLUCOSE SERPL-MCNC: 120 MG/DL — HIGH (ref 70–99)
HCO3 BLDA-SCNC: 26 MMOL/L — SIGNIFICANT CHANGE UP (ref 21–28)
HCT VFR BLD CALC: 35.6 % — LOW (ref 37–47)
HCV AB S/CO SERPL IA: 0.04 COI — SIGNIFICANT CHANGE UP
HCV AB SERPL-IMP: SIGNIFICANT CHANGE UP
HDLC SERPL-MCNC: 67 MG/DL — SIGNIFICANT CHANGE UP
HGB BLD-MCNC: 12.4 G/DL — SIGNIFICANT CHANGE UP (ref 12–16)
LIPID PNL WITH DIRECT LDL SERPL: 76 MG/DL — SIGNIFICANT CHANGE UP
MAGNESIUM SERPL-MCNC: 1.7 MG/DL — LOW (ref 1.8–2.4)
MCHC RBC-ENTMCNC: 30.5 PG — SIGNIFICANT CHANGE UP (ref 27–31)
MCHC RBC-ENTMCNC: 34.8 G/DL — SIGNIFICANT CHANGE UP (ref 32–37)
MCV RBC AUTO: 87.7 FL — SIGNIFICANT CHANGE UP (ref 81–99)
NON HDL CHOLESTEROL: 89 MG/DL — SIGNIFICANT CHANGE UP
NRBC # BLD: 0 /100 WBCS — SIGNIFICANT CHANGE UP (ref 0–0)
PCO2 BLDA: 39 MMHG — SIGNIFICANT CHANGE UP (ref 25–48)
PH BLDA: 7.44 — SIGNIFICANT CHANGE UP (ref 7.35–7.45)
PLATELET # BLD AUTO: 240 K/UL — SIGNIFICANT CHANGE UP (ref 130–400)
PO2 BLDA: 73 MMHG — LOW (ref 83–108)
POTASSIUM SERPL-MCNC: 4.2 MMOL/L — SIGNIFICANT CHANGE UP (ref 3.5–5)
POTASSIUM SERPL-SCNC: 4.2 MMOL/L — SIGNIFICANT CHANGE UP (ref 3.5–5)
PROT SERPL-MCNC: 5.9 G/DL — LOW (ref 6–8)
RBC # BLD: 4.06 M/UL — LOW (ref 4.2–5.4)
RBC # FLD: 13.2 % — SIGNIFICANT CHANGE UP (ref 11.5–14.5)
SAO2 % BLDA: 96.8 % — SIGNIFICANT CHANGE UP (ref 94–98)
SODIUM SERPL-SCNC: 141 MMOL/L — SIGNIFICANT CHANGE UP (ref 135–146)
TRIGL SERPL-MCNC: 63 MG/DL — SIGNIFICANT CHANGE UP
TROPONIN T SERPL-MCNC: 0.09 NG/ML — CRITICAL HIGH
TSH SERPL-MCNC: 0.52 UIU/ML — SIGNIFICANT CHANGE UP (ref 0.27–4.2)
WBC # BLD: 11.25 K/UL — HIGH (ref 4.8–10.8)
WBC # FLD AUTO: 11.25 K/UL — HIGH (ref 4.8–10.8)

## 2022-04-19 PROCEDURE — 99221 1ST HOSP IP/OBS SF/LOW 40: CPT | Mod: 57

## 2022-04-19 PROCEDURE — 71045 X-RAY EXAM CHEST 1 VIEW: CPT | Mod: 26,77

## 2022-04-19 PROCEDURE — 93010 ELECTROCARDIOGRAM REPORT: CPT

## 2022-04-19 PROCEDURE — 33208 INSRT HEART PM ATRIAL & VENT: CPT | Mod: KX

## 2022-04-19 PROCEDURE — 71045 X-RAY EXAM CHEST 1 VIEW: CPT | Mod: 26

## 2022-04-19 PROCEDURE — 99233 SBSQ HOSP IP/OBS HIGH 50: CPT

## 2022-04-19 RX ORDER — ATORVASTATIN CALCIUM 80 MG/1
80 TABLET, FILM COATED ORAL AT BEDTIME
Refills: 0 | Status: DISCONTINUED | OUTPATIENT
Start: 2022-04-19 | End: 2022-04-20

## 2022-04-19 RX ORDER — ACETAMINOPHEN 500 MG
650 TABLET ORAL EVERY 6 HOURS
Refills: 0 | Status: DISCONTINUED | OUTPATIENT
Start: 2022-04-19 | End: 2022-04-20

## 2022-04-19 RX ORDER — CHLORHEXIDINE GLUCONATE 213 G/1000ML
1 SOLUTION TOPICAL
Refills: 0 | Status: DISCONTINUED | OUTPATIENT
Start: 2022-04-19 | End: 2022-04-19

## 2022-04-19 RX ORDER — MAGNESIUM SULFATE 500 MG/ML
2 VIAL (ML) INJECTION ONCE
Refills: 0 | Status: COMPLETED | OUTPATIENT
Start: 2022-04-19 | End: 2022-04-19

## 2022-04-19 RX ORDER — PANTOPRAZOLE SODIUM 20 MG/1
40 TABLET, DELAYED RELEASE ORAL
Refills: 0 | Status: DISCONTINUED | OUTPATIENT
Start: 2022-04-19 | End: 2022-04-20

## 2022-04-19 RX ORDER — HEPARIN SODIUM 5000 [USP'U]/ML
5000 INJECTION INTRAVENOUS; SUBCUTANEOUS EVERY 12 HOURS
Refills: 0 | Status: DISCONTINUED | OUTPATIENT
Start: 2022-04-19 | End: 2022-04-20

## 2022-04-19 RX ORDER — CEFAZOLIN SODIUM 1 G
1000 VIAL (EA) INJECTION EVERY 8 HOURS
Refills: 0 | Status: COMPLETED | OUTPATIENT
Start: 2022-04-19 | End: 2022-04-20

## 2022-04-19 RX ORDER — ASPIRIN/CALCIUM CARB/MAGNESIUM 324 MG
81 TABLET ORAL DAILY
Refills: 0 | Status: DISCONTINUED | OUTPATIENT
Start: 2022-04-19 | End: 2022-04-20

## 2022-04-19 RX ORDER — PANTOPRAZOLE SODIUM 20 MG/1
40 TABLET, DELAYED RELEASE ORAL
Refills: 0 | Status: CANCELLED | OUTPATIENT
Start: 2022-04-20 | End: 2022-04-19

## 2022-04-19 RX ORDER — CHLORHEXIDINE GLUCONATE 213 G/1000ML
1 SOLUTION TOPICAL
Refills: 0 | Status: DISCONTINUED | OUTPATIENT
Start: 2022-04-19 | End: 2022-04-20

## 2022-04-19 RX ADMIN — ATORVASTATIN CALCIUM 80 MILLIGRAM(S): 80 TABLET, FILM COATED ORAL at 21:43

## 2022-04-19 RX ADMIN — Medication 25 GRAM(S): at 10:35

## 2022-04-19 RX ADMIN — HEPARIN SODIUM 5000 UNIT(S): 5000 INJECTION INTRAVENOUS; SUBCUTANEOUS at 20:17

## 2022-04-19 RX ADMIN — Medication 100 MILLIGRAM(S): at 21:40

## 2022-04-19 RX ADMIN — Medication 81 MILLIGRAM(S): at 12:13

## 2022-04-19 RX ADMIN — HEPARIN SODIUM 5000 UNIT(S): 5000 INJECTION INTRAVENOUS; SUBCUTANEOUS at 05:42

## 2022-04-19 RX ADMIN — CHLORHEXIDINE GLUCONATE 1 APPLICATION(S): 213 SOLUTION TOPICAL at 06:31

## 2022-04-19 NOTE — PROGRESS NOTE ADULT - ASSESSMENT
IMPRESSION:   # complete heart block  # cardiogenic shock secondary CHB  # HAGMA with lactic acidosis   # hx of CAD  # hx of HTN      PLAN:    CNS: avoid CNS depressants    HEENT:  Oral care.     PULMONARY:  HOB @ 45 degree. Pulmonary toilet     CARDIOVASCULAR: complete heart block and cardiogenic shock  Non-obstructive CAD on cath   - Asa, statin  - echo  - continue TVP and follow up with EP for PPM  - hold on BB   - add ace-i/ARB if BP tolerates.   - start losartan and amlodipine     GI: GI prophylaxis. PO feeds                      RENAL:  HAGMA and lactic acidosis  - trend lactate     INFECTIOUS DISEASE: monitor off antibiotics    HEMATOLOGICAL:  DVT prophylaxis.     ENDOCRINE:  Follow up FS.  Insulin protocol if needed.    DISPOSITION: CCU  Family to fax over medications, medrec not complete    RONJ TLC 4/18       IMPRESSION:   # complete heart block s/p TVP  # cardiogenic shock secondary CHB, resolved  # HAGMA with lactic acidosis, resolved  # hx of CAD  # hx of HTN      PLAN:    CNS: avoid CNS depressants    HEENT:  Oral care.     PULMONARY:  HOB @ 45 degree. Pulmonary toilet.    CARDIOVASCULAR: complete heart block and cardiogenic shock  Non-obstructive CAD on cath  - Asa, statin  - Trops downtrending  - f/u TSH  - TTE LVEF >55%, normal LV sys fxn, AV sclerotic, mod AR  - continue TVP and follow up with EP for PPM  - TVP checked this AM during rounds - threshold 0.5. TVP left at rate of 80 with 2.0 mA  - hold on BB   - add ace-i/ARB if BP tolerates.   - holding losartan and amlodipine for now given hypotension    GI: GI prophylaxis. PO feeds                      RENAL:  f/u lytes. Mg 1.7 -- replete    INFECTIOUS DISEASE: monitor off antibiotics    HEMATOLOGICAL:  DVT prophylaxis.     ENDOCRINE:  Follow up FS.  Insulin protocol if needed.    DISPOSITION: CCU  Family to fax over medications, medrec not complete    LIJ TLC 4/18  R fem TVP 4/18       IMPRESSION:   ·	CHB with ventricular escape with signs of hypoperfusion (elev lactate,  AMS)  - improved sp TVP 4/18  ·	Questionable STEMI - sp cath 4/18 showing patent grafts.   ·	AMS sp intubation 4/18 for airway protection, subsequently extubated 4/18.   ·	Cardiac hx: CAD / prior PCI / prior CABG in [place]    ·	Other hx:  HTN, osteoporosis        PLAN:    CNS: avoid CNS depressants    HEENT:  Oral care.     PULMONARY:  HOB @ 45 degree. Pulmonary toilet.    CARDIOVASCULAR: complete heart block and cardiogenic shock  - 4/18	Tte:  ef >55. Mod ai.   - 4/18	Cath: LM mild. LAD mid 100. LCX mild. RCA o100.  LIMA-LAD patent.  SVG-RPDA patent.  LVEDP 19. // sp TVP placement via R fem.   ---  - Cont  asa / statin   - continue TVP until ppm in  - TVP checked this AM during rounds - threshold 0.5. TVP left at rate of 80 with 2.0 mA  - PPM WITH CT SX today.       GI: GI prophylaxis. PO feeds                      RENAL:  f/u lytes. Mg 1.7 -- replete    INFECTIOUS DISEASE: monitor off antibiotics    HEMATOLOGICAL:  DVT prophylaxis.     ENDOCRINE:  Follow up FS.  Insulin protocol if needed.    DISPOSITION: CCU  Family to fax over medications, medrec not complete    R fem TVP 4/18

## 2022-04-19 NOTE — CONSULT NOTE ADULT - TIME BILLING
CHB  - recommend PPM implant
CTS Attending  pt interviewed and examined  chart, labs, xrays, cath and consultations reviewed  pt referred by Dr. Fields for DDD PPM due to CHB  pt informed of the need for ppm implant  procedure explained, including risks, benefits and alternatives, and agreed to proceed  pt has a lfet IJ TLC and a Rt femoral TVP  will remove all central access after PPM imlpantation  case discussed with Noni Fields and Orlin  keep npo for surgery today  -R

## 2022-04-19 NOTE — CONSULT NOTE ADULT - ASSESSMENT
CTS Attending  pt interviewed and examined  chart, labs, xrays, cath and consultations reviewed  pt referred by Dr. Fields for DDD PPM due to CHB  pt informed of the need for ppm implant  procedure explained, including risks, benefits and alternatives, and agreed to proceed  pt has a lfet IJ TLC and a Rt femoral TVP  will remove all central access after PPM imlpantation  case discussed with Noni Fields and Orlin  keep npo for surgery today  -R

## 2022-04-19 NOTE — CONSULT NOTE ADULT - SUBJECTIVE AND OBJECTIVE BOX
Surgeon: /Jocelyn/Kristine    Consult requesting by: Dr. Fields    HISTORY OF PRESENT ILLNESS:  73 yo female patient with PMHx of HTN, osteoporosis, CAD s/p multiple PCIs and CABG around 8 months ago in [place], brought in by EMS for dizziness.  Patient was at home, started feeling dizzy, lightheaded and sweaty, called EMS, found her bradycardic to 30s, applied pacing pads, did not respond to atropine, so started pacing her, received fentanyl and valium for agitation and pain from external pacing.  in ED, patient was found in complete heart block, transvenous pacer was inserted. Initial strip showing escape rhythm at 30bpm, with possible inferior ST elevations.    PAST MEDICAL & SURGICAL HISTORY:  HTN (hypertension)    Cardiomyopathy    Stented coronary artery    H/O prior ablation treatment        MEDICATIONS  (STANDING):  aspirin  chewable 81 milliGRAM(s) Oral daily  atorvastatin 80 milliGRAM(s) Oral at bedtime  chlorhexidine 4% Liquid 1 Application(s) Topical <User Schedule>  heparin   Injectable 5000 Unit(s) SubCutaneous every 12 hours  magnesium sulfate  IVPB 2 Gram(s) IV Intermittent once    MEDICATIONS  (PRN):  acetaminophen     Tablet .. 650 milliGRAM(s) Oral every 6 hours PRN Temp greater or equal to 38C (100.4F), Mild Pain (1 - 3)      Allergies    No Known Allergies          Review of Systems  cant obtain (patient intubated and sedated)    PHYSICAL EXAM  Vital Signs Last 24 Hrs  T(C): 37.6 (19 Apr 2022 04:00), Max: 37.7 (19 Apr 2022 00:00)  T(F): 99.6 (19 Apr 2022 04:00), Max: 99.8 (19 Apr 2022 00:00)  HR: 79 (19 Apr 2022 08:00) (72 - 114)  BP: 106/51 (19 Apr 2022 08:00) (91/54 - 206/88)  BP(mean): 74 (19 Apr 2022 08:00) (69 - 126)  RR: 25 (19 Apr 2022 08:00) (12 - 35)  SpO2: 98% (19 Apr 2022 08:00) (90% - 100%)    NEURO: patient is sedated  GEN: Not in acute distress  NECK: no thyroid enlargement  LUNGS: Clear to auscultation bilaterally   CARDIOVASCULAR: S1/S2 present, RRR , no murmurs  ABD: Soft, non-tender, non-distended, +BS  EXT: No JIMBO                                                          LABS:                        12.4   11.25 )-----------( 240      ( 19 Apr 2022 04:30 )             35.6     04-19    141  |  105  |  24<H>  ----------------------------<  120<H>  4.2   |  26  |  0.8    Ca    11.4<H>      19 Apr 2022 04:30  Mg     1.7     04-19    TPro  5.9<L>  /  Alb  3.7  /  TBili  0.9  /  DBili  x   /  AST  98<H>  /  ALT  122<H>  /  AlkPhos  84  04-19        CARDIAC MARKERS ( 19 Apr 2022 04:30 )  x     / 0.09 ng/mL / x     / x     / x      CARDIAC MARKERS ( 18 Apr 2022 18:22 )  x     / 0.13 ng/mL / x     / x     / x      CARDIAC MARKERS ( 18 Apr 2022 11:57 )  x     / <0.01 ng/mL / x     / x     / x          Covid Results: COVID-19 PCR: NotDetec (04-18-22 @ 10:52)        Assessment/ Plan:  To OR today with Dr. Banks for [employer] PPM               
73 yo female patient with PMHx of HTN, osteoporosis, CAD s/p multiple PCIs and CABG around 8 months ago in [place], brought in by EMS for dizziness.  Patient was at home, started feeling dizzy, lightheaded and sweaty, called EMS, found her bradycardic to 30s, applied pacing pads, did not respond to atropine, so started pacing her, received fentanyl and valium for agitation and pain from external pacing.  in ED, patient was found in complete heart block, transvenous pacer was inserted. Initial strip showing escape rhythm at 30bpm, with possible inferior ST elevations.    PAST MEDICAL & SURGICAL HISTORY  HTN (hypertension)    Cardiomyopathy    Stented coronary artery    H/O prior ablation treatment        FAMILY HISTORY:  FAMILY HISTORY:  No pertinent family history in first degree relatives        SOCIAL HISTORY:  cant obtain (patient intubated and sedated)    ALLERGIES:  No Known Allergies      MEDICATIONS:  MEDICATIONS  (STANDING):  ceFAZolin   IVPB 1000 milliGRAM(s) IV Intermittent once  lactated ringers Bolus 1000 milliLiter(s) IV Bolus once    MEDICATIONS  (PRN):      HOME MEDICATIONS:  Home Medications:  amLODIPine 5 mg oral tablet: 1 tab(s) orally once a day (02 Sep 2020 12:59)  Aspir 81 oral delayed release tablet: 1 tab(s) orally once a day (02 Sep 2020 12:59)  plavix  Lopressor 50 mg oral tablet: 1 tab(s) orally 2 times a day (02 Sep 2020 12:59)  simvastatin 40 mg oral tablet: 1 tab(s) orally once a day (at bedtime) (02 Sep 2020 12:59)      VITALS:   T(F): --  HR: 72 (04-18 @ 10:45) (72 - 114)  BP: 179/74 (04-18 @ 10:45) (91/54 - 179/74)  BP(mean): --  RR: 17 (04-18 @ 10:45) (17 - 18)  SpO2: 100% (04-18 @ 10:45) (90% - 100%)    I&O's Summary      REVIEW OF SYSTEMS:  cant obtain (patient intubated and sedated)    PHYSICAL EXAM:  NEURO: patient is sedated  GEN: Not in acute distress  NECK: no thyroid enlargement  LUNGS: Clear to auscultation bilaterally   CARDIOVASCULAR: S1/S2 present, RRR , no murmurs  ABD: Soft, non-tender, non-distended, +BS  EXT: No JIMBO    LABS:                    Troponin trend:            RADIOLOGY:  -CXR: no acute pathology    ECG:  complete heart block with ventricular escape rhythm to 30bpm, inferior Q waves and ST elevations  
HPI:  71 yo female patient with PMHx of HTN, osteoporosis, CAD s/p multiple PCIs and CABG around 8 months ago in [place], brought in by EMS for dizziness.  Patient was at home, started feeling dizzy, lightheaded and sweaty, called EMS, found her bradycardic to 30s, applied pacing pads, did not respond to atropine, so started pacing her, received fentanyl and valium for agitation and pain from external pacing.  in ED, patient was found in complete heart block, transvenous pacer was inserted. Initial strip showing escape rhythm at 30bpm, with possible inferior ST elevations.    PAST MEDICAL & SURGICAL HISTORY  HTN (hypertension)    Cardiomyopathy    Stented coronary artery    H/O prior ablation treatment        FAMILY HISTORY:  FAMILY HISTORY:  No pertinent family history in first degree relatives        SOCIAL HISTORY:  cant obtain (patient intubated and sedated)    ALLERGIES:  No Known Allergies      MEDICATIONS:  MEDICATIONS  (STANDING):  ceFAZolin   IVPB 1000 milliGRAM(s) IV Intermittent once  lactated ringers Bolus 1000 milliLiter(s) IV Bolus once    MEDICATIONS  (PRN):      HOME MEDICATIONS:  Home Medications:  amLODIPine 5 mg oral tablet: 1 tab(s) orally once a day (02 Sep 2020 12:59)  Aspir 81 oral delayed release tablet: 1 tab(s) orally once a day (02 Sep 2020 12:59)  plavix  Lopressor 50 mg oral tablet: 1 tab(s) orally 2 times a day (02 Sep 2020 12:59)  simvastatin 40 mg oral tablet: 1 tab(s) orally once a day (at bedtime) (02 Sep 2020 12:59)      VITALS:   T(F): --  HR: 72 (04-18 @ 10:45) (72 - 114)  BP: 179/74 (04-18 @ 10:45) (91/54 - 179/74)  BP(mean): --  RR: 17 (04-18 @ 10:45) (17 - 18)  SpO2: 100% (04-18 @ 10:45) (90% - 100%)    I&O's Summary      REVIEW OF SYSTEMS:  cant obtain (patient intubated and sedated)    PHYSICAL EXAM:  NEURO: patient is sedated  GEN: Not in acute distress  NECK: no thyroid enlargement  LUNGS: Clear to auscultation bilaterally   CARDIOVASCULAR: S1/S2 present, RRR , no murmurs  ABD: Soft, non-tender, non-distended, +BS  EXT: No JIMBO    LABS:                    Troponin trend:            RADIOLOGY:  -CXR: no acute pathology    ECG:  complete heart block with ventricular escape rhythm to 30bpm, inferior Q waves and ST elevations

## 2022-04-19 NOTE — PRE-ANESTHESIA EVALUATION ADULT - NSANTHOSAYNRD_GEN_A_CORE
No. ERVIN screening performed.  STOP BANG Legend: 0-2 = LOW Risk; 3-4 = INTERMEDIATE Risk; 5-8 = HIGH Risk

## 2022-04-19 NOTE — PROGRESS NOTE ADULT - SUBJECTIVE AND OBJECTIVE BOX
INTERVAL HISTORY:    PAST MEDICAL & SURGICAL HISTORY  HTN (hypertension)    Cardiomyopathy    Stented coronary artery    H/O prior ablation treatment        ALLERGIES:  No Known Allergies      MEDICATIONS:  MEDICATIONS  (STANDING):  aspirin  chewable 81 milliGRAM(s) Oral daily  atorvastatin 80 milliGRAM(s) Oral at bedtime  chlorhexidine 4% Liquid 1 Application(s) Topical <User Schedule>  dexMEDEtomidine Infusion 0.05 MICROgram(s)/kG/Hr (0.75 mL/Hr) IV Continuous <Continuous>  fentaNYL   Infusion. 0.5 MICROgram(s)/kG/Hr (3 mL/Hr) IV Continuous <Continuous>  heparin   Injectable 5000 Unit(s) SubCutaneous every 12 hours  lactated ringers Bolus 1000 milliLiter(s) IV Bolus once  pantoprazole   Suspension 40 milliGRAM(s) Oral daily  sodium chloride 0.9%. 1000 milliLiter(s) (150 mL/Hr) IV Continuous <Continuous>    MEDICATIONS  (PRN):  acetaminophen     Tablet .. 650 milliGRAM(s) Oral every 6 hours PRN Temp greater or equal to 38C (100.4F), Mild Pain (1 - 3)      HOME MEDICATIONS:  Home Medications:  amLODIPine 5 mg oral tablet: 1 tab(s) orally once a day (02 Sep 2020 12:59)  Aspir 81 oral delayed release tablet: 1 tab(s) orally once a day (02 Sep 2020 12:59)  Lopressor 50 mg oral tablet: 1 tab(s) orally 2 times a day (02 Sep 2020 12:59)  simvastatin 40 mg oral tablet: 1 tab(s) orally once a day (at bedtime) (02 Sep 2020 12:59)        OBJECTIVE:  ICU Vital Signs Last 24 Hrs  T(C): 37.6 (2022 04:00), Max: 37.7 (2022 00:00)  T(F): 99.6 (2022 04:00), Max: 99.8 (2022 00:00)  HR: 79 (2022 07:00) (72 - 114)  BP: 109/54 (2022 07:00) (91/54 - 206/88)  BP(mean): 75 (2022 07:00) (69 - 126)  ABP: --  ABP(mean): --  RR: 19 (2022 07:00) (12 - 35)  SpO2: 98% (2022 07:00) (90% - 100%)    Mode: standby    Adult Advanced Hemodynamics Last 24 Hrs  CVP(mm Hg): --  CVP(cm H2O): --  CO: --  CI: --  PA: --  PA(mean): --  PCWP: --  SVR: --  SVRI: --  PVR: --  PVRI: --  I&O's Summary    2022 07:01  -  2022 07:00  --------------------------------------------------------  IN: 798 mL / OUT: 2820 mL / NET: - mL      Daily Height in cm: 170.18 (2022 12:48)    Daily Weight in k.7 (2022 05:00)    PHYSICAL EXAM:  NEURO: patient is awake , alert and oriented  GEN: Not in acute distress  NECK: no thyroid enlargement, no JVD  LUNGS: Clear to auscultation bilaterally   CARDIOVASCULAR: S1/S2 present, RRR , no murmurs or rubs, no carotid bruits,  + PP bilaterally  ABD: Soft, non-tender, non-distended, +BS  EXT: No JIMBO  SKIN: Intact  ACCESS Site:    LABS:                        12.4   11.25 )-----------( 240      ( 2022 04:30 )             35.6         141  |  105  |  24<H>  ----------------------------<  120<H>  4.2   |  26  |  0.8    Ca    11.4<H>      2022 04:30  Mg     1.7         TPro  5.9<L>  /  Alb  3.7  /  TBili  0.9  /  DBili  x   /  AST  98<H>  /  ALT  122<H>  /  AlkPhos  84        Troponin T, Serum: 0.09 ng/mL *HH* (22 @ 04:30)  Lactate, Blood: 1.5 mmol/L (22 @ 18:22)  Troponin T, Serum: 0.13 ng/mL *HH* (22 @ 18:22)  Troponin T, Serum: <0.01 ng/mL (22 @ 11:57)    CARDIAC MARKERS ( 2022 04:30 )  x     / 0.09 ng/mL / x     / x     / x      CARDIAC MARKERS ( 2022 18:22 )  x     / 0.13 ng/mL / x     / x     / x      CARDIAC MARKERS ( 2022 11:57 )  x     / <0.01 ng/mL / x     / x     / x          Tropinin Trend:      Chol 156 LDL -- HDL 67 Trig 63      RADIOLOGY:  -CXR:  -TTE:  -STRESS TEST:  -CATHETERIZATION:    ECG:    TELEMETRY EVENTS:       INTERVAL HISTORY:  RIJ TVP removed and LIJ TLC placed yesterday. Extubated yesterday evening. No other events o/n. No complaints this morning.       PAST MEDICAL & SURGICAL HISTORY  HTN (hypertension)    Cardiomyopathy    Stented coronary artery    H/O prior ablation treatment        ALLERGIES:  No Known Allergies      MEDICATIONS:  MEDICATIONS  (STANDING):  aspirin  chewable 81 milliGRAM(s) Oral daily  atorvastatin 80 milliGRAM(s) Oral at bedtime  chlorhexidine 4% Liquid 1 Application(s) Topical <User Schedule>  dexMEDEtomidine Infusion 0.05 MICROgram(s)/kG/Hr (0.75 mL/Hr) IV Continuous <Continuous>  fentaNYL   Infusion. 0.5 MICROgram(s)/kG/Hr (3 mL/Hr) IV Continuous <Continuous>  heparin   Injectable 5000 Unit(s) SubCutaneous every 12 hours  lactated ringers Bolus 1000 milliLiter(s) IV Bolus once  pantoprazole   Suspension 40 milliGRAM(s) Oral daily  sodium chloride 0.9%. 1000 milliLiter(s) (150 mL/Hr) IV Continuous <Continuous>    MEDICATIONS  (PRN):  acetaminophen     Tablet .. 650 milliGRAM(s) Oral every 6 hours PRN Temp greater or equal to 38C (100.4F), Mild Pain (1 - 3)      HOME MEDICATIONS:  Home Medications:  amLODIPine 5 mg oral tablet: 1 tab(s) orally once a day (02 Sep 2020 12:59)  Aspir 81 oral delayed release tablet: 1 tab(s) orally once a day (02 Sep 2020 12:59)  Lopressor 50 mg oral tablet: 1 tab(s) orally 2 times a day (02 Sep 2020 12:59)  simvastatin 40 mg oral tablet: 1 tab(s) orally once a day (at bedtime) (02 Sep 2020 12:59)        OBJECTIVE:  ICU Vital Signs Last 24 Hrs  T(C): 37.6 (2022 04:00), Max: 37.7 (2022 00:00)  T(F): 99.6 (2022 04:00), Max: 99.8 (2022 00:00)  HR: 79 (2022 07:00) (72 - 114)  BP: 109/54 (2022 07:00) (91/54 - 206/88)  BP(mean): 75 (2022 07:00) (69 - 126)  RR: 19 (2022 07:00) (12 - 35)  SpO2: 98% (2022 07:00) (90% - 100%)      I&O's Summary    2022 07:01  -  2022 07:00  --------------------------------------------------------  IN: 798 mL / OUT: 2820 mL / NET: - mL      Daily Height in cm: 170.18 (2022 12:48)    Daily Weight in k.7 (2022 05:00)    PHYSICAL EXAM:  NEURO: patient is AAOx3  GEN: Not in acute distress  NECK: no thyroid enlargement  LUNGS: CTA b/l no wheezing, rhonchi, or rales.  CARDIOVASCULAR: S1/S2 present, RRR, no murmurs or rubs. R fem TVP  ABD: Soft, non-tender, non-distended  EXT: No JIMBO  SKIN: Intact  ACCESS Site: R fem TVP, LIJ TLC    LABS:                        12.4   11.25 )-----------( 240      ( 2022 04:30 )             35.6         141  |  105  |  24<H>  ----------------------------<  120<H>  4.2   |  26  |  0.8    Ca    11.4<H>      2022 04:30  Mg     1.7         TPro  5.9<L>  /  Alb  3.7  /  TBili  0.9  /  DBili  x   /  AST  98<H>  /  ALT  122<H>  /  AlkPhos  84        Troponin T, Serum: 0.09 ng/mL *HH* (22 @ 04:30)  Lactate, Blood: 1.5 mmol/L (22 @ 18:22)  Troponin T, Serum: 0.13 ng/mL *HH* (22 @ 18:22)  Troponin T, Serum: <0.01 ng/mL (22 @ 11:57)    CARDIAC MARKERS ( 2022 04:30 )  x     / 0.09 ng/mL / x     / x     / x      CARDIAC MARKERS ( 2022 18:22 )  x     / 0.13 ng/mL / x     / x     / x      CARDIAC MARKERS ( 2022 11:57 )  x     / <0.01 ng/mL / x     / x     / x          Tropinin Trend:      Chol 156 LDL -- HDL 67 Trig 63      RADIOLOGY:  -CXR: no effusions or opacities  -TTE: LVEF >55%, normal LV sys fxn, AV sclerotic, mod AR  -STRESS TEST:  -CATHETERIZATION: [facility]  - severe atherosclerosis, aneurysm of mid LAD, 100% stenosis of mid LAD distally, distal LAD supplied by patent LIMA to LAD; 100% occluded RCA at the ostium. R fem TVP placed.    ECG:  ventricular paced rhythm, complete dissociated p-waves    TELEMETRY EVENTS:  no events on tele

## 2022-04-19 NOTE — PRE-ANESTHESIA EVALUATION ADULT - NSANTHPMHFT_GEN_ALL_CORE
71yo F with PMH of HTN, Osteoporosis, CAD s/p multiple stents and CABG 8 months ago who is admitted for Complete Heart [place] present for pacemaker insertion.

## 2022-04-20 ENCOUNTER — TRANSCRIPTION ENCOUNTER (OUTPATIENT)
Age: 72
End: 2022-04-20

## 2022-04-20 VITALS
OXYGEN SATURATION: 99 % | RESPIRATION RATE: 22 BRPM | SYSTOLIC BLOOD PRESSURE: 122 MMHG | DIASTOLIC BLOOD PRESSURE: 74 MMHG | HEART RATE: 91 BPM

## 2022-04-20 LAB
ALBUMIN SERPL ELPH-MCNC: 3.4 G/DL — LOW (ref 3.5–5.2)
ALP SERPL-CCNC: 76 U/L — SIGNIFICANT CHANGE UP (ref 30–115)
ALT FLD-CCNC: 54 U/L — HIGH (ref 0–41)
ANION GAP SERPL CALC-SCNC: 12 MMOL/L — SIGNIFICANT CHANGE UP (ref 7–14)
ANION GAP SERPL CALC-SCNC: 8 MMOL/L — SIGNIFICANT CHANGE UP (ref 7–14)
AST SERPL-CCNC: 46 U/L — HIGH (ref 0–41)
BASOPHILS # BLD AUTO: 0.06 K/UL — SIGNIFICANT CHANGE UP (ref 0–0.2)
BASOPHILS NFR BLD AUTO: 0.6 % — SIGNIFICANT CHANGE UP (ref 0–1)
BILIRUB SERPL-MCNC: 0.6 MG/DL — SIGNIFICANT CHANGE UP (ref 0.2–1.2)
BUN SERPL-MCNC: 16 MG/DL — SIGNIFICANT CHANGE UP (ref 10–20)
BUN SERPL-MCNC: 19 MG/DL — SIGNIFICANT CHANGE UP (ref 10–20)
CALCIUM SERPL-MCNC: 8.9 MG/DL — SIGNIFICANT CHANGE UP (ref 8.5–10.1)
CALCIUM SERPL-MCNC: 9.1 MG/DL — SIGNIFICANT CHANGE UP (ref 8.5–10.1)
CHLORIDE SERPL-SCNC: 101 MMOL/L — SIGNIFICANT CHANGE UP (ref 98–110)
CHLORIDE SERPL-SCNC: 104 MMOL/L — SIGNIFICANT CHANGE UP (ref 98–110)
CO2 SERPL-SCNC: 25 MMOL/L — SIGNIFICANT CHANGE UP (ref 17–32)
CO2 SERPL-SCNC: 26 MMOL/L — SIGNIFICANT CHANGE UP (ref 17–32)
CREAT SERPL-MCNC: 0.6 MG/DL — LOW (ref 0.7–1.5)
CREAT SERPL-MCNC: 0.7 MG/DL — SIGNIFICANT CHANGE UP (ref 0.7–1.5)
EGFR: 92 ML/MIN/1.73M2 — SIGNIFICANT CHANGE UP
EGFR: 95 ML/MIN/1.73M2 — SIGNIFICANT CHANGE UP
EOSINOPHIL # BLD AUTO: 0.06 K/UL — SIGNIFICANT CHANGE UP (ref 0–0.7)
EOSINOPHIL NFR BLD AUTO: 0.6 % — SIGNIFICANT CHANGE UP (ref 0–8)
GLUCOSE SERPL-MCNC: 106 MG/DL — HIGH (ref 70–99)
GLUCOSE SERPL-MCNC: 145 MG/DL — HIGH (ref 70–99)
HCT VFR BLD CALC: 35.4 % — LOW (ref 37–47)
HGB BLD-MCNC: 12.3 G/DL — SIGNIFICANT CHANGE UP (ref 12–16)
IMM GRANULOCYTES NFR BLD AUTO: 0.4 % — HIGH (ref 0.1–0.3)
LYMPHOCYTES # BLD AUTO: 1.51 K/UL — SIGNIFICANT CHANGE UP (ref 1.2–3.4)
LYMPHOCYTES # BLD AUTO: 15.3 % — LOW (ref 20.5–51.1)
MAGNESIUM SERPL-MCNC: 1.7 MG/DL — LOW (ref 1.8–2.4)
MCHC RBC-ENTMCNC: 30.7 PG — SIGNIFICANT CHANGE UP (ref 27–31)
MCHC RBC-ENTMCNC: 34.7 G/DL — SIGNIFICANT CHANGE UP (ref 32–37)
MCV RBC AUTO: 88.3 FL — SIGNIFICANT CHANGE UP (ref 81–99)
MONOCYTES # BLD AUTO: 0.75 K/UL — HIGH (ref 0.1–0.6)
MONOCYTES NFR BLD AUTO: 7.6 % — SIGNIFICANT CHANGE UP (ref 1.7–9.3)
NEUTROPHILS # BLD AUTO: 7.45 K/UL — HIGH (ref 1.4–6.5)
NEUTROPHILS NFR BLD AUTO: 75.5 % — HIGH (ref 42.2–75.2)
NRBC # BLD: 0 /100 WBCS — SIGNIFICANT CHANGE UP (ref 0–0)
PLATELET # BLD AUTO: 172 K/UL — SIGNIFICANT CHANGE UP (ref 130–400)
POTASSIUM SERPL-MCNC: 3.4 MMOL/L — LOW (ref 3.5–5)
POTASSIUM SERPL-MCNC: 3.7 MMOL/L — SIGNIFICANT CHANGE UP (ref 3.5–5)
POTASSIUM SERPL-SCNC: 3.4 MMOL/L — LOW (ref 3.5–5)
POTASSIUM SERPL-SCNC: 3.7 MMOL/L — SIGNIFICANT CHANGE UP (ref 3.5–5)
PROT SERPL-MCNC: 5.4 G/DL — LOW (ref 6–8)
RBC # BLD: 4.01 M/UL — LOW (ref 4.2–5.4)
RBC # FLD: 13.2 % — SIGNIFICANT CHANGE UP (ref 11.5–14.5)
SODIUM SERPL-SCNC: 138 MMOL/L — SIGNIFICANT CHANGE UP (ref 135–146)
SODIUM SERPL-SCNC: 138 MMOL/L — SIGNIFICANT CHANGE UP (ref 135–146)
WBC # BLD: 9.87 K/UL — SIGNIFICANT CHANGE UP (ref 4.8–10.8)
WBC # FLD AUTO: 9.87 K/UL — SIGNIFICANT CHANGE UP (ref 4.8–10.8)

## 2022-04-20 PROCEDURE — 93010 ELECTROCARDIOGRAM REPORT: CPT

## 2022-04-20 PROCEDURE — 71045 X-RAY EXAM CHEST 1 VIEW: CPT | Mod: 26

## 2022-04-20 PROCEDURE — 99233 SBSQ HOSP IP/OBS HIGH 50: CPT

## 2022-04-20 RX ORDER — SIMVASTATIN 20 MG/1
1 TABLET, FILM COATED ORAL
Qty: 0 | Refills: 0 | DISCHARGE

## 2022-04-20 RX ORDER — METOPROLOL TARTRATE 50 MG
50 TABLET ORAL DAILY
Refills: 0 | Status: DISCONTINUED | OUTPATIENT
Start: 2022-04-20 | End: 2022-04-20

## 2022-04-20 RX ORDER — ALENDRONATE SODIUM 70 MG/1
1 TABLET ORAL
Qty: 0 | Refills: 0 | DISCHARGE

## 2022-04-20 RX ORDER — MAGNESIUM SULFATE 500 MG/ML
2 VIAL (ML) INJECTION ONCE
Refills: 0 | Status: COMPLETED | OUTPATIENT
Start: 2022-04-20 | End: 2022-04-20

## 2022-04-20 RX ORDER — ATORVASTATIN CALCIUM 80 MG/1
1 TABLET, FILM COATED ORAL
Qty: 30 | Refills: 1
Start: 2022-04-20 | End: 2022-06-18

## 2022-04-20 RX ORDER — POTASSIUM CHLORIDE 20 MEQ
40 PACKET (EA) ORAL ONCE
Refills: 0 | Status: COMPLETED | OUTPATIENT
Start: 2022-04-20 | End: 2022-04-20

## 2022-04-20 RX ORDER — AMLODIPINE BESYLATE 2.5 MG/1
1 TABLET ORAL
Qty: 0 | Refills: 0 | DISCHARGE

## 2022-04-20 RX ORDER — METOPROLOL TARTRATE 50 MG
1 TABLET ORAL
Qty: 0 | Refills: 0 | DISCHARGE

## 2022-04-20 RX ADMIN — Medication 81 MILLIGRAM(S): at 13:12

## 2022-04-20 RX ADMIN — Medication 100 MILLIGRAM(S): at 05:22

## 2022-04-20 RX ADMIN — CHLORHEXIDINE GLUCONATE 1 APPLICATION(S): 213 SOLUTION TOPICAL at 07:23

## 2022-04-20 RX ADMIN — HEPARIN SODIUM 5000 UNIT(S): 5000 INJECTION INTRAVENOUS; SUBCUTANEOUS at 05:22

## 2022-04-20 RX ADMIN — Medication 25 GRAM(S): at 10:50

## 2022-04-20 RX ADMIN — Medication 50 MILLIGRAM(S): at 10:49

## 2022-04-20 RX ADMIN — Medication 40 MILLIEQUIVALENT(S): at 10:59

## 2022-04-20 RX ADMIN — PANTOPRAZOLE SODIUM 40 MILLIGRAM(S): 20 TABLET, DELAYED RELEASE ORAL at 07:23

## 2022-04-20 NOTE — DISCHARGE NOTE PROVIDER - PROVIDER TOKENS
PROVIDER:[TOKEN:[[id number]],FOLLOWUP:[1 month]] PROVIDER:[TOKEN:[[id number]],FOLLOWUP:[1 month]],PROVIDER:[TOKEN:[[id number]],FOLLOWUP:[1 week]] PROVIDER:[TOKEN:[[id number]],FOLLOWUP:[1 month]],PROVIDER:[TOKEN:[[id number]],SCHEDULEDAPPT:[04/27/2022],SCHEDULEDAPPTTIME:[09:30 AM]],PROVIDER:[TOKEN:[[id number]],FOLLOWUP:[1 month],ESTABLISHEDPATIENT:[T]]

## 2022-04-20 NOTE — DISCHARGE NOTE PROVIDER - NSDCMRMEDTOKEN_GEN_ALL_CORE_FT
amLODIPine 5 mg oral tablet: 1 tab(s) orally once a day  Aspir 81 oral delayed release tablet: 1 tab(s) orally once a day  Lopressor 50 mg oral tablet: 1 tab(s) orally 2 times a day  simvastatin 40 mg oral tablet: 1 tab(s) orally once a day (at bedtime)   Aspir 81 oral delayed release tablet: 1 tab(s) orally once a day  Fosamax 70 mg oral tablet: 1 tab(s) orally once a week  hydroCHLOROthiazide 12.5 mg oral tablet: 1 tab(s) orally once a day   Aspir 81 oral delayed release tablet: 1 tab(s) orally once a day  atorvastatin 80 mg oral tablet: 1 tab(s) orally once a day (at bedtime)  Fosamax 70 mg oral tablet: 1 tab(s) orally once a week  Metoprolol Succinate ER 50 mg oral tablet, extended release: 1 tab(s) orally once a day

## 2022-04-20 NOTE — PROGRESS NOTE ADULT - ATTENDING COMMENTS
Patient seen, case d/w CCU team on rounds.  Patient with h/o CAD, recent CABG.  Cardiogenic shock due to CHB and severe bradycardia. Resolved with pacing.  S/p PPM yesterday.  CXR reviewed personally, both leads in good position.  Will ambulate patient and d/c home in the afternoon.  F/u with Dr. Fields for PPM management.
Patient seen, case d/w CCU team on rounds.  Patient presented with severe bradycardia, CHB (reportedly),  cardiogenic shock. Improved immediately with TVP.  NO escape rhythm today, pacing threshold 0.5 mA.  Normal LVEF.  For PPM today.  Continue CCU monitoring.

## 2022-04-20 NOTE — DISCHARGE NOTE PROVIDER - NSDCACTIVITY_GEN_ALL_CORE
Do not raise left arm above shoulder level for 1 month. Do not lift more than 5 pounds with left arm for 1 month.

## 2022-04-20 NOTE — DISCHARGE NOTE PROVIDER - CARE PROVIDER_API CALL
José Miguel Fields; DONNA)  [place] [address]  Phone: [phone]  Fax: [phone]  Follow Up Time: 1 month   José Miguel Fields; DONNA)  CardiologyElectrophyslgy [facility]  [address]  [place] [address]  Phone: [phone]  Fax: [phone]  Follow Up Time: 1 month    Benny Banks)  Surgery; Thoracic and Cardiac Surgery  [address]  [place] [address]  Phone: [phone]  Fax: [phone]  Follow Up Time: 1 week   José Miguel Fields; DONNA)  CardiologyElectrophyslgy Ctr  [address]  [place] [address]  Phone: [phone]  Fax: [phone]  Follow Up Time: 1 month    Benny Banks)  Surgery; Thoracic and Cardiac Surgery  [address]  [place] [address]  Phone: [phone]  Fax: [phone]  Scheduled Appointment: 04/27/2022 09:30 AM    Maxime Nguyen)  Cardiovascular Disease  [address] [place] [address]  Phone: [phone]  Fax: [phone]  Established Patient  Follow Up Time: 1 month

## 2022-04-20 NOTE — DISCHARGE NOTE PROVIDER - NSDCFUSCHEDAPPT_GEN_ALL_CORE_FT
FAUSTO ANGULO ; 05/18/2022 ; NPP Cardio [address] [facility] FAUSTO ANGULO ; 04/27/2022 ; NPP Cts[facility] [address] FAUSTO Banks ; 05/18/2022 ; NPP Cardio [address] [facility]

## 2022-04-20 NOTE — DISCHARGE NOTE PROVIDER - CARE PROVIDERS DIRECT ADDRESSES
,[email or web address] ,[email or web address],DirectAddress_Unknown

## 2022-04-20 NOTE — PROGRESS NOTE ADULT - SUBJECTIVE AND OBJECTIVE BOX
INTERVAL HPI/OVERNIGHT EVENTS:    Patietn s/p PPM implant  No events over night. Pt without complaints    MEDICATIONS  (STANDING):  aspirin  chewable 81 milliGRAM(s) Oral daily  atorvastatin 80 milliGRAM(s) Oral at bedtime  chlorhexidine 4% Liquid 1 Application(s) Topical <User Schedule>  heparin   Injectable 5000 Unit(s) SubCutaneous every 12 hours  magnesium sulfate  IVPB 2 Gram(s) IV Intermittent once  metoprolol succinate ER 50 milliGRAM(s) Oral daily  pantoprazole    Tablet 40 milliGRAM(s) Oral before breakfast  potassium chloride    Tablet ER 40 milliEquivalent(s) Oral once    MEDICATIONS  (PRN):  acetaminophen     Tablet .. 650 milliGRAM(s) Oral every 6 hours PRN Temp greater or equal to 38C (100.4F), Mild Pain (1 - 3)    Allergies    No Known Allergies    Intolerances    Vital Signs Last 24 Hrs  T(C): 37.2 (20 Apr 2022 08:00), Max: 37.7 (20 Apr 2022 04:00)  T(F): 98.9 (20 Apr 2022 08:00), Max: 99.8 (20 Apr 2022 04:00)  HR: 99 (20 Apr 2022 09:00) (77 - 99)  BP: 129/60 (20 Apr 2022 09:00) (93/66 - 180/70)  BP(mean): 87 (20 Apr 2022 09:00) (71 - 114)  RR: 21 (20 Apr 2022 09:00) (13 - 24)  SpO2: 98% (20 Apr 2022 09:00) (96% - 99%)    Wound healing well; No hematoma; no bleeding    RADIOLOGY & ADDITIONAL TESTS:  CXR shows lead in good position

## 2022-04-20 NOTE — DISCHARGE NOTE NURSING/CASE MANAGEMENT/SOCIAL WORK - PATIENT PORTAL LINK FT
You can access the FollowMyHealth Patient Portal offered by [facility] by registering at the following website: [email or web address]. By joining [employer]’s FollowMyHealth portal, you will also be able to view your health information using other applications (apps) compatible with our system.

## 2022-04-20 NOTE — DISCHARGE NOTE PROVIDER - HOSPITAL COURSE
72 year old female with a PMHx of HTN, osteoporosis, CAD s/p multiple PCIs and CABG around 8 months ago in [place]    CC: dizziness    History goes back to: day of admission when patient was at home, started feeling dizzy, lightheaded and sweaty, called EMS, found her bradycardic to 30 with complete AV block.  She did not respond to atropine, so started pacing.   In the ED patient was obtunded and, and in complete heart block. She was intubated and transvenous pacer was inserted.   Initial strip showing escape rhythm at 30bpm, with possible inferior ST elevations?    Patient seen by cardiology and taken to cardiac cath.   LM: mild disease  LAD: severe atherosclerosis, aneurysm of mid LAD, 100% stenosis of mid LAD distally, distal LAD supplied by patent LIMA to LAD  LCX: mild disease  OM1: large sized, minor luminal irregularities  RCA: 100% occluded at the ostium    Cath showed patent grafts.    Pt had R fem TVP placed during cath with improvement. She was extubated 4/18 and has been stable on RA. She underwent PPM 4/19 with CT surgery. EKG this morning showed atrial-sensed ventricular pacing at rate of . Will resume home BB today. She will need to follow-up with her cardiologist and the electrophysiologist. She is medically stable for discharge at this time.    72 year old female with a PMHx of HTN, osteoporosis, CAD s/p multiple PCIs and CABG around 8 months ago in [place]    CC: dizziness    History goes back to: day of admission when patient was at home, started feeling dizzy, lightheaded and sweaty, called EMS, found her bradycardic to 30 with complete AV block.  She did not respond to atropine, so started pacing.   In the ED patient was obtunded and, and in complete heart block. She was intubated and transvenous pacer was inserted.   Initial strip showing escape rhythm at 30bpm, with possible inferior ST elevations?    Patient seen by cardiology and taken to cardiac cath.   LM: mild disease  LAD: severe atherosclerosis, aneurysm of mid LAD, 100% stenosis of mid LAD distally, distal LAD supplied by patent LIMA to LAD  LCX: mild disease  OM1: large sized, minor luminal irregularities  RCA: 100% occluded at the ostium    Cath showed patent grafts.    Pt had R fem TVP placed during cath with improvement. She was extubated 4/18 and has been stable on RA. She underwent PPM 4/19 with CT surgery. EKG this morning showed atrial-sensed ventricular pacing at rate of . Will resume home BB today. She will need to follow-up with her cardiologist and the electrophysiologist. She is medically stable for discharge at this time.     Progress Note:  IMPRESSION:   CHB with ventricular escape with signs of hypoperfusion (elev lactate,  AMS)  - sp  PPM by CT SX 4/19  NSTEMI - sp cath 4/18 showing patent grafts.   AMS sp intubation 4/18 for airway protection, subsequently extubated 4/18.   Cardiac hx: CAD / prior PCI / prior CABG in [place]    Other hx:  HTN, osteoporosis        PLAN:    CNS: avoid CNS depressants    HEENT:  Oral care.     PULMONARY:  HOB @ 45 degree. Pulmonary toilet.    CARDIOVASCULAR: complete heart block and cardiogenic shock  - 4/18	Tte:  ef >55. Mod ai.   - 4/18	Cath: LM mild. LAD mid 100. LCX mild. RCA o100.  LIMA-LAD patent.  SVG-RPDA patent.  LVEDP 19. // sp TVP placement via R fem.   - 4/19    SP PPM implantation  ---  - Cont  asa / statin   - CXR in am done  / reviewed.   - EP FOR DEVICE INTERROGATION THIS AM.    - Possible DC home later today.    - Start lopressor 25 BID  - Replete K + Mg       GI: GI prophylaxis. PO feeds                      RENAL:  f/u lytes. K 3.4, Mg 1.7 -- replete    INFECTIOUS DISEASE: monitor off antibiotics    HEMATOLOGICAL:  DVT prophylaxis.     ENDOCRINE:  Follow up FS.  Insulin protocol if needed.    DISPOSITION: DC HOME   72 year old female with a PMHx of HTN, osteoporosis, CAD s/p multiple PCIs and CABG around 8 months ago in [place]    CC: dizziness    History goes back to: day of admission when patient was at home, started feeling dizzy, lightheaded and sweaty, called EMS, found her bradycardic to 30 with complete AV block.  She did not respond to atropine, so started pacing.   In the ED patient was obtunded and, and in complete heart block. She was intubated and transvenous pacer was inserted.   Initial strip showing escape rhythm at 30bpm, with possible inferior ST elevations?    Patient seen by cardiology and taken to cardiac cath.   LM: mild disease  LAD: severe atherosclerosis, aneurysm of mid LAD, 100% stenosis of mid LAD distally, distal LAD supplied by patent LIMA to LAD  LCX: mild disease  OM1: large sized, minor luminal irregularities  RCA: 100% occluded at the ostium    Cath showed patent grafts.    Pt had R fem TVP placed during cath with improvement. She was extubated 4/18 and has been stable on RA. She underwent PPM 4/19 with CT surgery. EKG this morning showed atrial-sensed ventricular pacing at rate of . Will resume home BB today. She will need to follow-up with her cardiologist and the electrophysiologist. Also f/u with CT SX in 1 week. She is medically stable for discharge at this time.    Progress Note:  IMPRESSION:   CHB with ventricular escape with signs of hypoperfusion (elev lactate,  AMS)  - sp  PPM by CT SX 4/19  NSTEMI - sp cath 4/18 showing patent grafts.   AMS sp intubation 4/18 for airway protection, subsequently extubated 4/18.   Cardiac hx: CAD / prior PCI / prior CABG in [place]    Other hx:  HTN, osteoporosis        PLAN:    CNS: avoid CNS depressants    HEENT:  Oral care.     PULMONARY:  HOB @ 45 degree. Pulmonary toilet.    CARDIOVASCULAR: complete heart block and cardiogenic shock  - 4/18	Tte:  ef >55. Mod ai.   - 4/18	Cath: LM mild. LAD mid 100. LCX mild. RCA o100.  LIMA-LAD patent.  SVG-RPDA patent.  LVEDP 19. // sp TVP placement via R fem.   - 4/19    SP PPM implantation  ---  - Cont  asa / statin   - CXR in am done  / reviewed.   - EP FOR DEVICE INTERROGATION THIS AM.    - Possible DC home later today.    - Start lopressor 25 BID  - Replete K + Mg       GI: GI prophylaxis. PO feeds                      RENAL:  f/u lytes. K 3.4, Mg 1.7 -- replete    INFECTIOUS DISEASE: monitor off antibiotics    HEMATOLOGICAL:  DVT prophylaxis.     ENDOCRINE:  Follow up FS.  Insulin protocol if needed.    DISPOSITION: DC HOME

## 2022-04-20 NOTE — PROGRESS NOTE ADULT - SUBJECTIVE AND OBJECTIVE BOX
INTERVAL HISTORY:    PAST MEDICAL & SURGICAL HISTORY  HTN (hypertension)    Cardiomyopathy    Stented coronary artery    H/O prior ablation treatment        ALLERGIES:  No Known Allergies      MEDICATIONS:  MEDICATIONS  (STANDING):  aspirin  chewable 81 milliGRAM(s) Oral daily  atorvastatin 80 milliGRAM(s) Oral at bedtime  chlorhexidine 4% Liquid 1 Application(s) Topical <User Schedule>  heparin   Injectable 5000 Unit(s) SubCutaneous every 12 hours  pantoprazole    Tablet 40 milliGRAM(s) Oral before breakfast    MEDICATIONS  (PRN):  acetaminophen     Tablet .. 650 milliGRAM(s) Oral every 6 hours PRN Temp greater or equal to 38C (100.4F), Mild Pain (1 - 3)      HOME MEDICATIONS:  Home Medications:  amLODIPine 5 mg oral tablet: 1 tab(s) orally once a day (02 Sep 2020 12:59)  Aspir 81 oral delayed release tablet: 1 tab(s) orally once a day (02 Sep 2020 12:59)  Lopressor 50 mg oral tablet: 1 tab(s) orally 2 times a day (02 Sep 2020 12:59)  simvastatin 40 mg oral tablet: 1 tab(s) orally once a day (at bedtime) (02 Sep 2020 12:59)        OBJECTIVE:  ICU Vital Signs Last 24 Hrs  T(C): 37.7 (2022 04:00), Max: 37.7 (2022 04:00)  T(F): 99.8 (2022 04:00), Max: 99.8 (2022 04:00)  HR: 98 (2022 06:00) (77 - 98)  BP: 146/63 (2022 06:00) (103/51 - 146/63)  BP(mean): 91 (2022 06:00) (71 - 114)  ABP: --  ABP(mean): --  RR: 21 (2022 06:00) (13 - 25)  SpO2: 98% (2022 06:00) (96% - 99%)      Adult Advanced Hemodynamics Last 24 Hrs  CVP(mm Hg): --  CVP(cm H2O): --  CO: --  CI: --  PA: --  PA(mean): --  PCWP: --  SVR: --  SVRI: --  PVR: --  PVRI: --  I&O's Summary    2022 07:01  -  2022 07:00  --------------------------------------------------------  IN: 390 mL / OUT: 875 mL / NET: -485 mL      Daily Height in cm: 170.18 (2022 14:05)    Daily Weight in k (2022 06:00)    PHYSICAL EXAM:  NEURO: patient is awake , alert and oriented  GEN: Not in acute distress  NECK: no thyroid enlargement, no JVD  LUNGS: Clear to auscultation bilaterally   CARDIOVASCULAR: S1/S2 present, RRR , no murmurs or rubs, no carotid bruits,  + PP bilaterally  ABD: Soft, non-tender, non-distended, +BS  EXT: No JIMBO  SKIN: Intact  ACCESS Site:    LABS:                        12.3   9.87  )-----------( 172      ( 2022 04:44 )             35.4     04-20    138  |  104  |  19  ----------------------------<  106<H>  3.4<L>   |  26  |  0.7    Ca    8.9      2022 04:44  Mg     1.7     20    TPro  5.4<L>  /  Alb  3.4<L>  /  TBili  0.6  /  DBili  x   /  AST  46<H>  /  ALT  54<H>  /  AlkPhos  76  04-20        CARDIAC MARKERS ( 2022 04:30 )  x     / 0.09 ng/mL / x     / x     / x      CARDIAC MARKERS ( 2022 18:22 )  x     / 0.13 ng/mL / x     / x     / x      CARDIAC MARKERS ( 2022 11:57 )  x     / <0.01 ng/mL / x     / x     / x          Tropinin Trend:      Chol 156 LDL -- HDL 67 Trig 63      RADIOLOGY:  -CXR:  -TTE:  -STRESS TEST:  -CATHETERIZATION:    ECG:    TELEMETRY EVENTS:       INTERVAL HISTORY:  s/p PPM by CT SX yesterday. No other events o/n. Pt has no complaints this morning. Reports feeling well. Denies CP, SOB.     PAST MEDICAL & SURGICAL HISTORY  HTN (hypertension)    Cardiomyopathy    Stented coronary artery    H/O prior ablation treatment        ALLERGIES:  No Known Allergies      MEDICATIONS:  MEDICATIONS  (STANDING):  aspirin  chewable 81 milliGRAM(s) Oral daily  atorvastatin 80 milliGRAM(s) Oral at bedtime  chlorhexidine 4% Liquid 1 Application(s) Topical <User Schedule>  heparin   Injectable 5000 Unit(s) SubCutaneous every 12 hours  pantoprazole    Tablet 40 milliGRAM(s) Oral before breakfast    MEDICATIONS  (PRN):  acetaminophen     Tablet .. 650 milliGRAM(s) Oral every 6 hours PRN Temp greater or equal to 38C (100.4F), Mild Pain (1 - 3)      HOME MEDICATIONS:  Home Medications:  amLODIPine 5 mg oral tablet: 1 tab(s) orally once a day (02 Sep 2020 12:59)  Aspir 81 oral delayed release tablet: 1 tab(s) orally once a day (02 Sep 2020 12:59)  Lopressor 50 mg oral tablet: 1 tab(s) orally 2 times a day (02 Sep 2020 12:59)  simvastatin 40 mg oral tablet: 1 tab(s) orally once a day (at bedtime) (02 Sep 2020 12:59)        OBJECTIVE:  ICU Vital Signs Last 24 Hrs  T(C): 37.7 (2022 04:00), Max: 37.7 (2022 04:00)  T(F): 99.8 (2022 04:00), Max: 99.8 (2022 04:00)  HR: 98 (2022 06:00) (77 - 98)  BP: 146/63 (2022 06:00) (103/51 - 146/63)  BP(mean): 91 (2022 06:00) (71 - 114)  RR: 21 (2022 06:00) (13 - 25)  SpO2: 98% (2022 06:00) (96% - 99%)      I&O's Summary    2022 07:01  -  2022 07:00  --------------------------------------------------------  IN: 390 mL / OUT: 875 mL / NET: -485 mL      Daily Height in cm: 170.18 (2022 14:05)    Daily Weight in k (2022 06:00)    PHYSICAL EXAM:  NEURO: patient is AAOx3  GEN: Not in acute distress  NECK: no thyroid enlargement  LUNGS: CTA b/l no wheezing, rhonchi, or rales  CARDIOVASCULAR: S1/S2 present, RRR , no murmurs or rubs  ABD: Soft, non-tender, non-distended  EXT: No JIMBO  SKIN: Intact  ACCESS Site: L upper chest dressing inspected, appears clean w/o drainage, no hematoma.    LABS:                        12.3   9.87  )-----------( 172      ( 2022 04:44 )             35.4     04-20    138  |  104  |  19  ----------------------------<  106<H>  3.4<L>   |  26  |  0.7    Ca    8.9      2022 04:44  Mg     1.7     04-20    TPro  5.4<L>  /  Alb  3.4<L>  /  TBili  0.6  /  DBili  x   /  AST  46<H>  /  ALT  54<H>  /  AlkPhos  76  04-20        CARDIAC MARKERS ( 2022 04:30 )  x     / 0.09 ng/mL / x     / x     / x      CARDIAC MARKERS ( 2022 18:22 )  x     / 0.13 ng/mL / x     / x     / x      CARDIAC MARKERS ( 2022 11:57 )  x     / <0.01 ng/mL / x     / x     / x          Tropinin Trend:      Chol 156 LDL -- HDL 67 Trig 63      RADIOLOGY:  -CXR: s/p PPM. no PTX. leads in place  -TTE: LVEF >55%, normal LV sys fxn, AV sclerotic, mod AR  -STRESS TEST: n/a  -CATHETERIZATION: [facility]  - severe atherosclerosis, aneurysm of mid LAD, 100% stenosis of mid LAD distally, distal LAD supplied by patent LIMA to LAD; 100% occluded RCA at the ostium. R fem TVP placed.    ECG:  atrial sensed, ventricularly paced rhythm. HR 94    TELEMETRY EVENTS:  no events on tele

## 2022-04-20 NOTE — DISCHARGE NOTE NURSING/CASE MANAGEMENT/SOCIAL WORK - NSDCPEFALRISK_GEN_ALL_CORE
For information on Fall & Injury Prevention, visit: [email or web address] OR  [email or web address] OR  https://www.cdc.gov/steadi/patient.html

## 2022-04-20 NOTE — CHART NOTE - NSCHARTNOTEFT_GEN_A_CORE
Pressure dressing Removed. May remove outer dressing in 24 hrs. Keep steri strips on til appointment. F/u with Dr Banks in 1 week on   April 27th @ 930am. Thank You

## 2022-04-20 NOTE — PROGRESS NOTE ADULT - ASSESSMENT
IMPRESSION:   ·	CHB with ventricular escape with signs of hypoperfusion (elev lactate,  AMS)  - improved sp TVP 4/18  ·	Questionable STEMI - sp cath 4/18 showing patent grafts.   ·	AMS sp intubation 4/18 for airway protection, subsequently extubated 4/18.   ·	Cardiac hx: CAD / prior PCI / prior CABG in [place]    ·	Other hx:  HTN, osteoporosis        PLAN:    CNS: avoid CNS depressants    HEENT:  Oral care.     PULMONARY:  HOB @ 45 degree. Pulmonary toilet.    CARDIOVASCULAR: complete heart block and cardiogenic shock  - 4/18	Tte:  ef >55. Mod ai.   - 4/18	Cath: LM mild. LAD mid 100. LCX mild. RCA o100.  LIMA-LAD patent.  SVG-RPDA patent.  LVEDP 19. // sp TVP placement via R fem.   ---  - Cont  asa / statin   - s/p PPM by CT SX 4/19      GI: GI prophylaxis. PO feeds                      RENAL:  f/u lytes. K 3.4, Mg 1.7 -- replete    INFECTIOUS DISEASE: monitor off antibiotics    HEMATOLOGICAL:  DVT prophylaxis.     ENDOCRINE:  Follow up FS.  Insulin protocol if needed.    DISPOSITION: CCU  Family to fax over medications, medrec not complete       IMPRESSION:   ·	CHB with ventricular escape with signs of hypoperfusion (elev lactate,  AMS)  - sp  PPM by CT SX 4/19  ·	NSTEMI - sp cath 4/18 showing patent grafts.   ·	AMS sp intubation 4/18 for airway protection, subsequently extubated 4/18.   ·	Cardiac hx: CAD / prior PCI / prior CABG in [place]    ·	Other hx:  HTN, osteoporosis        PLAN:    CNS: avoid CNS depressants    HEENT:  Oral care.     PULMONARY:  HOB @ 45 degree. Pulmonary toilet.    CARDIOVASCULAR: complete heart block and cardiogenic shock  - 4/18	Tte:  ef >55. Mod ai.   - 4/18	Cath: LM mild. LAD mid 100. LCX mild. RCA o100.  LIMA-LAD patent.  SVG-RPDA patent.  LVEDP 19. // sp TVP placement via R fem.   - 4/19    SP PPM implantation  ---  - Cont  asa / statin   - CXR in am done  / reviewed.   - EP FOR DEVICE INTERROGATION THIS AM.    - Possible DC home later today.    - Start lopressor 25 BID  - Replete K + Mg       GI: GI prophylaxis. PO feeds                      RENAL:  f/u lytes. K 3.4, Mg 1.7 -- replete    INFECTIOUS DISEASE: monitor off antibiotics    HEMATOLOGICAL:  DVT prophylaxis.     ENDOCRINE:  Follow up FS.  Insulin protocol if needed.    DISPOSITION: DC HOME  Family to fax over medications, medrec not complete       IMPRESSION:   ·	CHB with ventricular escape with signs of hypoperfusion (elev lactate,  AMS)  - sp  PPM by CT SX 4/19  ·	NSTEMI - sp cath 4/18 showing patent grafts.   ·	AMS sp intubation 4/18 for airway protection, subsequently extubated 4/18.   ·	Cardiac hx: CAD / prior PCI / prior CABG in [place]    ·	Other hx:  HTN, osteoporosis        PLAN:    CNS: avoid CNS depressants    HEENT:  Oral care.     PULMONARY:  HOB @ 45 degree. Pulmonary toilet.    CARDIOVASCULAR: complete heart block and cardiogenic shock  - 4/18	Tte:  ef >55. Mod ai.   - 4/18	Cath: LM mild. LAD mid 100. LCX mild. RCA o100.  LIMA-LAD patent.  SVG-RPDA patent.  LVEDP 19. // sp TVP placement via R fem.   - 4/19    SP PPM implantation  ---  - Cont  asa / statin   - CXR in am done  / reviewed.   - EP FOR DEVICE INTERROGATION THIS AM.    - Possible DC home later today.    - Start toprol 50 q24h  - Replete K + Mg       GI: GI prophylaxis. PO feeds                      RENAL:  f/u lytes. K 3.4, Mg 1.7 -- replete    INFECTIOUS DISEASE: monitor off antibiotics    HEMATOLOGICAL:  DVT prophylaxis.     ENDOCRINE:  Follow up FS.  Insulin protocol if needed.    DISPOSITION: DC HOME  Family to fax over medications, medrec not complete

## 2022-04-20 NOTE — DISCHARGE NOTE PROVIDER - NSDCCPCAREPLAN_GEN_ALL_CORE_FT
PRINCIPAL DISCHARGE DIAGNOSIS  Diagnosis: Complete heart block  Assessment and Plan of Treatment: You came to the hospital because you were feeling dizzy and lightheadedness. You were found to have a very slow heart rate and an electrocardiogram (EKG) showed complete heart block which is when the pacemaker of the heart and the pumping ventricles are not beating in sync. You have a pacemaker placed into your vein. Your EKG also showed ST elevations which can be a sign of a heart attack, or myocardial infarction. You went for cardiac catheterization which found no occlusions in your coronary arteries. You had a permanent pacemaker placed by the cardiothoracic surgeons on 4/19. You will need to follow-up with your cardiologist and the electrophysiologist in 1 month. If you develop any chest pain, shortness of breath, or lightheadedness please return to the nearest emergency department.      SECONDARY DISCHARGE DIAGNOSES  Diagnosis: STEMI (ST elevation myocardial infarction)  Assessment and Plan of Treatment:      PRINCIPAL DISCHARGE DIAGNOSIS  Diagnosis: Complete heart block  Assessment and Plan of Treatment: You came to the hospital because you were feeling dizzy and lightheadedness. You were found to have a very slow heart rate and an electrocardiogram (EKG) showed complete heart block which is when the pacemaker of the heart and the pumping ventricles are not beating in sync. You have a pacemaker placed into your vein. Your EKG also showed ST elevations which can be a sign of a heart attack, or myocardial infarction. You went for cardiac catheterization which found no occlusions in your coronary arteries. You had a permanent pacemaker placed by the cardiothoracic surgeons on 4/19. You will need to follow-up with your cardiologist and the electrophysiologist in 1 month. If you develop any chest pain, shortness of breath, or lightheadedness please return to the nearest emergency department.      SECONDARY DISCHARGE DIAGNOSES  Diagnosis: STEMI (ST elevation myocardial infarction)  Assessment and Plan of Treatment: You presented with EKG changes suggestives of a STEMI. Cardiac cath found no evidence of coronary artery occlusive disease.     PRINCIPAL DISCHARGE DIAGNOSIS  Diagnosis: Complete heart block  Assessment and Plan of Treatment: You came to the hospital because you were feeling dizzy and lightheadedness. You were found to have a very slow heart rate and an electrocardiogram (EKG) showed complete heart block which is when the pacemaker of the heart and the pumping ventricles are not beating in sync. You have a pacemaker placed into your vein. Your EKG also showed ST elevations which can be a sign of a heart attack, or myocardial infarction. You went for cardiac catheterization which found no occlusions in your coronary arteries. You had a permanent pacemaker placed by the cardiothoracic surgeons on 4/19. You will need to follow-up with your cardiologist and the electrophysiologist in 1 month. You will also need to follow-up with the cardiothoracic surgeon in 1 week. If you develop any chest pain, shortness of breath, or lightheadedness please return to the nearest emergency department.      SECONDARY DISCHARGE DIAGNOSES  Diagnosis: STEMI (ST elevation myocardial infarction)  Assessment and Plan of Treatment: You presented with EKG changes suggestives of a STEMI. Cardiac cath found no evidence of coronary artery occlusive disease.     PRINCIPAL DISCHARGE DIAGNOSIS  Diagnosis: Complete heart block  Assessment and Plan of Treatment: You came to the hospital because you were feeling dizzy and lightheadedness. You were found to have a very slow heart rate and an electrocardiogram (EKG) showed complete heart block which is when the pacemaker of the heart and the pumping ventricles are not beating in sync. You have a pacemaker placed into your vein. Your EKG also showed ST elevations which can be a sign of a heart attack, or myocardial infarction. You went for cardiac catheterization which found no occlusions in your coronary arteries. You had a permanent pacemaker placed by the cardiothoracic surgeons on 4/19. You will need to follow-up with your cardiologist and the electrophysiologist in 1 month. You will also need to follow-up with the cardiothoracic surgeon in 1 week. If you develop any chest pain, shortness of breath, or lightheadedness please return to the nearest emergency department. You may remove the outer dressing in 24 hrs. Keep steri strips on til appointment.      SECONDARY DISCHARGE DIAGNOSES  Diagnosis: STEMI (ST elevation myocardial infarction)  Assessment and Plan of Treatment: You presented with EKG changes suggestives of a STEMI. Cardiac cath found no evidence of coronary artery occlusive disease.

## 2022-04-20 NOTE — PROGRESS NOTE ADULT - REASON FOR ADMISSION
Pre-op Diagnosis: Malignant neoplasm of lateral wall of urinary bladder ([facility] [address] [C67.2]    The above referenced H&P was reviewed by Chelsy Fernandez MD on 12/31/2020, the patient was examined and no significant changes have occurred in the patient's condition sin
dizziness

## 2022-04-20 NOTE — PROGRESS NOTE ADULT - ASSESSMENT
A/P   Patient s/p PPM implant ([employer]) by Dr. Banks    - CXR done.  Leads in good position  - Device interrogation showed no events.  Device is functioning properly  - Pt cannot raise left arm above shoulder level x 1 month  - Pt cannot lift >5 pounds with left arm x 1 month   - Remainder of post op care per CT surgery  - follow up with Dr. Fields in 1 month for post op check ([id number])  - ok for discharge today from EP standpoint

## 2022-04-21 RX ORDER — METOPROLOL TARTRATE 50 MG
1 TABLET ORAL
Qty: 30 | Refills: 1
Start: 2022-04-21 | End: 2022-06-19

## 2022-04-26 RX ORDER — CLOPIDOGREL BISULFATE 75 MG/1
75 TABLET, FILM COATED ORAL
Qty: 90 | Refills: 0 | Status: DISCONTINUED | COMMUNITY
Start: 2021-08-10 | End: 2022-04-26

## 2022-04-27 ENCOUNTER — APPOINTMENT (OUTPATIENT)
Dept: CARDIOTHORACIC SURGERY | Facility: CLINIC | Age: 72
End: 2022-04-27
Payer: MEDICARE

## 2022-04-27 ENCOUNTER — NON-APPOINTMENT (OUTPATIENT)
Age: 72
End: 2022-04-27

## 2022-04-27 VITALS
OXYGEN SATURATION: 96 % | WEIGHT: 130 LBS | DIASTOLIC BLOOD PRESSURE: 70 MMHG | RESPIRATION RATE: 14 BRPM | SYSTOLIC BLOOD PRESSURE: 151 MMHG | HEIGHT: 66 IN | HEART RATE: 80 BPM | BODY MASS INDEX: 20.89 KG/M2

## 2022-04-27 DIAGNOSIS — Z79.82 LONG TERM (CURRENT) USE OF ASPIRIN: ICD-10-CM

## 2022-04-27 DIAGNOSIS — I44.2 ATRIOVENTRICULAR BLOCK, COMPLETE: ICD-10-CM

## 2022-04-27 DIAGNOSIS — M81.0 AGE-RELATED OSTEOPOROSIS WITHOUT CURRENT PATHOLOGICAL FRACTURE: ICD-10-CM

## 2022-04-27 DIAGNOSIS — I10 ESSENTIAL (PRIMARY) HYPERTENSION: ICD-10-CM

## 2022-04-27 DIAGNOSIS — R57.0 CARDIOGENIC SHOCK: ICD-10-CM

## 2022-04-27 DIAGNOSIS — R00.1 BRADYCARDIA, UNSPECIFIED: ICD-10-CM

## 2022-04-27 DIAGNOSIS — I25.10 ATHEROSCLEROTIC HEART DISEASE OF NATIVE CORONARY ARTERY WITHOUT ANGINA PECTORIS: ICD-10-CM

## 2022-04-27 DIAGNOSIS — I42.9 CARDIOMYOPATHY, UNSPECIFIED: ICD-10-CM

## 2022-04-27 DIAGNOSIS — Z95.1 PRESENCE OF AORTOCORONARY BYPASS GRAFT: ICD-10-CM

## 2022-04-27 DIAGNOSIS — I21.4 NON-ST ELEVATION (NSTEMI) MYOCARDIAL INFARCTION: ICD-10-CM

## 2022-04-27 DIAGNOSIS — E78.5 HYPERLIPIDEMIA, UNSPECIFIED: ICD-10-CM

## 2022-04-27 DIAGNOSIS — Z20.822 CONTACT WITH AND (SUSPECTED) EXPOSURE TO COVID-19: ICD-10-CM

## 2022-04-27 DIAGNOSIS — R42 DIZZINESS AND GIDDINESS: ICD-10-CM

## 2022-04-27 DIAGNOSIS — Z95.5 PRESENCE OF CORONARY ANGIOPLASTY IMPLANT AND GRAFT: ICD-10-CM

## 2022-04-27 PROCEDURE — 99024 POSTOP FOLLOW-UP VISIT: CPT

## 2022-05-18 ENCOUNTER — APPOINTMENT (OUTPATIENT)
Dept: CARDIOLOGY | Facility: CLINIC | Age: 72
End: 2022-05-18
Payer: MEDICARE

## 2022-05-18 VITALS
WEIGHT: 132 LBS | SYSTOLIC BLOOD PRESSURE: 149 MMHG | DIASTOLIC BLOOD PRESSURE: 69 MMHG | BODY MASS INDEX: 21.21 KG/M2 | HEART RATE: 69 BPM | TEMPERATURE: 97.3 F | HEIGHT: 66 IN

## 2022-05-18 DIAGNOSIS — Z82.49 FAMILY HISTORY OF ISCHEMIC HEART DISEASE AND OTHER DISEASES OF THE CIRCULATORY SYSTEM: ICD-10-CM

## 2022-05-18 DIAGNOSIS — Z48.89 ENCOUNTER FOR OTHER SPECIFIED SURGICAL AFTERCARE: ICD-10-CM

## 2022-05-18 PROCEDURE — 99024 POSTOP FOLLOW-UP VISIT: CPT

## 2022-05-18 PROCEDURE — 93280 PM DEVICE PROGR EVAL DUAL: CPT

## 2022-05-18 PROCEDURE — 93000 ELECTROCARDIOGRAM COMPLETE: CPT | Mod: 59

## 2022-05-18 RX ORDER — METOPROLOL TARTRATE 50 MG/1
50 TABLET, FILM COATED ORAL DAILY
Refills: 0 | Status: DISCONTINUED | COMMUNITY
End: 2022-05-18

## 2022-05-18 RX ORDER — METOPROLOL TARTRATE 100 MG/1
100 TABLET, FILM COATED ORAL
Refills: 0 | Status: COMPLETED | COMMUNITY
End: 2022-05-18

## 2022-05-18 NOTE — PROCEDURE
[NSR] : normal sinus rhythm [No] : not [Pacemaker] : pacemaker [DDD] : DDD [Longevity: ___ months] : The estimated remaining battery life is [unfilled] months [Threshold Testing Performed] : Threshold testing was performed [Lead Imp:  ___ohms] : lead impedance was [unfilled] ohms [Sensing Amplitude ___mv] : sensing amplitude was [unfilled] mv [___V @] : [unfilled] V [___ ms] : [unfilled] ms [Programmed for Longevity] : output reprogrammed for improved battery longevity [de-identified] : SANDRA DAVEY DR MRI [de-identified] : W1DR01 [de-identified] : [id number] [de-identified] : 4/19/2022 [de-identified] : 60 [de-identified] :  99%\par AP 0.9\par short NSVT for 1 sec

## 2022-05-18 NOTE — ASSESSMENT
[FreeTextEntry1] : 72 years old female s/p Dual chamber pacemaker on 4/19/2022 for complete heart block\par \par ECG ( 5/18/2022) 69 bpm V paced MA 164ms QRS 158ms\par \par # Device interrogation - V sensing at 50bpm\par  stable parameter \par  \par # Wound check - has 1/2 mm very superficial suture line opening in the mid incision. applied bacitracin.\par  steri strips applied. asked patient to keep it for 1-2 weeks or let it fall by itself.\par Maintaining left arm restrictions for another 2 weeks.\par \par # She is enrolled in remote monitoring\par Remote monitoring was discussed with patient, schedule, process,  as well as associated co-pay that may not covered by their insurance.\par \par RTO in 6 months with NP/MK

## 2022-05-18 NOTE — PHYSICAL EXAM
[General Appearance - Well Developed] : well developed [Well Groomed] : well groomed [General Appearance - Well Nourished] : well nourished [General Appearance - In No Acute Distress] : no acute distress [Heart Rate And Rhythm] : heart rate and rhythm were normal [Heart Sounds] : normal S1 and S2 [Edema] : no peripheral edema present [] : no respiratory distress [Respiration, Rhythm And Depth] : normal respiratory rhythm and effort [Auscultation Breath Sounds / Voice Sounds] : lungs were clear to auscultation bilaterally [Left Infraclavicular] : left infraclavicular area [Clean] : clean [Dry] : dry [Bowel Sounds] : normal bowel sounds [Abdomen Soft] : soft [Bleeding] : no active bleeding [Purulent Drainage] : no purulent drainage [Serous Drainage] : no serous drainage [Erythema] : not erythematous [Tender] : not tender [FreeTextEntry2] : 1/2 mm  superficial opening mid  part of the incision

## 2022-05-18 NOTE — HISTORY OF PRESENT ILLNESS
[de-identified] : 72 years old female admitted in [facility] on 4/18 thru ED in Bradycardia with complete AVB. Was intubated and  TVP was inserted. \par PMH includes HTN, osteoporosis, CAD with multiple PCI 5-6 years ago. She had  CABG and as per patient she had a myomectomy done at same time in [facility] July 2021.\par Echo on 4/18 showed EF of 55% with moderate to severe MR. She was taken to the cardiac cath lab for NSTEMI found to have patent SVG to RPDA and patent LIMA to LAD.\par Once stable and extubated she received a dual chamber PPM on 4/19 by Dr. Marte.\par \par Cardiologist: Dr. Tran and Dr. Nguyen\par She travels to and from [place] several times a year.\par \par She presents today for wound check and device interrogation.\par Denies any sob, TORRE, PND, orthopnea, no palpitations, no dizziness,lightheadedness, denies chest pains\par \par

## 2022-06-15 ENCOUNTER — APPOINTMENT (OUTPATIENT)
Dept: CARDIOLOGY | Facility: CLINIC | Age: 72
End: 2022-06-15

## 2022-06-15 VITALS
DIASTOLIC BLOOD PRESSURE: 70 MMHG | BODY MASS INDEX: 21.21 KG/M2 | SYSTOLIC BLOOD PRESSURE: 120 MMHG | HEART RATE: 64 BPM | RESPIRATION RATE: 16 BRPM | WEIGHT: 132 LBS | HEIGHT: 66 IN | TEMPERATURE: 98 F

## 2022-06-15 PROCEDURE — 99213 OFFICE O/P EST LOW 20 MIN: CPT

## 2022-06-15 PROCEDURE — 93000 ELECTROCARDIOGRAM COMPLETE: CPT | Mod: 59

## 2022-06-15 PROCEDURE — 93280 PM DEVICE PROGR EVAL DUAL: CPT

## 2022-06-24 ENCOUNTER — APPOINTMENT (OUTPATIENT)
Dept: CARDIOLOGY | Facility: CLINIC | Age: 72
End: 2022-06-24

## 2022-06-30 NOTE — PROCEDURE
[No] : not [Pacemaker] : pacemaker [DDD] : DDD [Longevity: ___ months] : The estimated remaining battery life is [unfilled] months [Threshold Testing Performed] : Threshold testing was performed [Lead Imp:  ___ohms] : lead impedance was [unfilled] ohms [Sensing Amplitude ___mv] : sensing amplitude was [unfilled] mv [___V @] : [unfilled] V [___ ms] : [unfilled] ms [Programmed for Longevity] : output reprogrammed for improved battery longevity [Complete Heart Block] : complete heart block [See Device Printout] : See device printout [de-identified] : SANDRA DAVEY DR MRI [de-identified] : W1DR01 [de-identified] : [id number] [de-identified] : 4/19/2022 [de-identified] : 60 [de-identified] : 14.8 years [de-identified] : Rate response added, atrial and ventricular amplitudes decreased to 2.5V [de-identified] :  99.7%\par AP 4.6\par

## 2022-06-30 NOTE — HISTORY OF PRESENT ILLNESS
[de-identified] : Cardio: Dr. Nguyen\par \par \par 72 years old female admitted in [facility] on 4/18 thru ED in Bradycardia with complete AVB. Was intubated and  TVP was inserted. \par PMH includes HTN, osteoporosis, CAD with multiple PCI 5-6 years ago. She had  CABG (at [facility]) and as per patient she had a myomectomy done at same time in [facility] July 2021.\par Echo on 4/18 showed EF of 55% with moderate to severe MR. She was taken to the cardiac cath lab for NSTEMI found to have patent SVG to RPDA and patent LIMA to LAD.\par Once stable and extubated she received a dual chamber PPM on 4/19 by Dr. Marte.\par \par Cardiologist: Dr. Tran and Dr. Nguyen\par She travels to and from [place] several times a year. Going to see Dr. Silva? In [place] for EP. \par \par She presents today for wound check and device interrogation.\par Denies any sob, TORRE, PND, orthopnea, no palpitations, no dizziness,lightheadedness, denies chest pain.\par She reports feeling more fatigued.\par \par

## 2022-06-30 NOTE — PHYSICAL EXAM
[General Appearance - Well Developed] : well developed [Well Groomed] : well groomed [General Appearance - Well Nourished] : well nourished [General Appearance - In No Acute Distress] : no acute distress [Heart Rate And Rhythm] : heart rate and rhythm were normal [Heart Sounds] : normal S1 and S2 [Edema] : no peripheral edema present [] : no respiratory distress [Respiration, Rhythm And Depth] : normal respiratory rhythm and effort [Auscultation Breath Sounds / Voice Sounds] : lungs were clear to auscultation bilaterally [Left Infraclavicular] : left infraclavicular area [Clean] : clean [Abdomen Soft] : soft [Dry] : dry [Well-Healed] : well-healed [Bleeding] : no active bleeding [Purulent Drainage] : no purulent drainage [Serous Drainage] : no serous drainage [Erythema] : not erythematous [Tender] : not tender

## 2022-06-30 NOTE — ASSESSMENT
[FreeTextEntry1] : 72 years old female s/p Dual chamber pacemaker on 4/19/2022 for complete heart block\par  \par \par # Device interrogation - CHB\par Device is working normally. All parameters stable. Patient feeling fatigued - poor heart rate variability on histograms. Rate response added. Increasing daily physical activity encouraged.\par - She is on remote and transmitting.\par \par Continue same meds. \par \par \par RTO in 6 months with NP/MK

## 2022-07-22 ENCOUNTER — OUTPATIENT (OUTPATIENT)
Dept: OUTPATIENT SERVICES | Facility: HOSPITAL | Age: 72
LOS: 1 days | Discharge: HOME | End: 2022-07-22

## 2022-07-22 ENCOUNTER — APPOINTMENT (OUTPATIENT)
Dept: UROGYNECOLOGY | Facility: CLINIC | Age: 72
End: 2022-07-22

## 2022-07-22 VITALS — SYSTOLIC BLOOD PRESSURE: 130 MMHG | DIASTOLIC BLOOD PRESSURE: 62 MMHG

## 2022-07-22 DIAGNOSIS — N81.3 COMPLETE UTEROVAGINAL PROLAPSE: ICD-10-CM

## 2022-07-22 DIAGNOSIS — Z98.890 OTHER SPECIFIED POSTPROCEDURAL STATES: Chronic | ICD-10-CM

## 2022-07-22 DIAGNOSIS — K62.3 RECTAL PROLAPSE: ICD-10-CM

## 2022-07-22 DIAGNOSIS — N89.8 OTHER SPECIFIED NONINFLAMMATORY DISORDERS OF VAGINA: ICD-10-CM

## 2022-07-22 PROCEDURE — 51797 INTRAABDOMINAL PRESSURE TEST: CPT | Mod: 26

## 2022-07-22 PROCEDURE — 51741 ELECTRO-UROFLOWMETRY FIRST: CPT | Mod: 26

## 2022-07-22 PROCEDURE — 51784 ANAL/URINARY MUSCLE STUDY: CPT | Mod: 26

## 2022-07-22 PROCEDURE — 51728 CYSTOMETROGRAM W/VP: CPT | Mod: 26

## 2022-07-24 ENCOUNTER — NON-APPOINTMENT (OUTPATIENT)
Age: 72
End: 2022-07-24

## 2022-07-29 DIAGNOSIS — K62.3 RECTAL PROLAPSE: ICD-10-CM

## 2022-07-29 DIAGNOSIS — N81.3 COMPLETE UTEROVAGINAL PROLAPSE: ICD-10-CM

## 2022-07-29 DIAGNOSIS — N89.8 OTHER SPECIFIED NONINFLAMMATORY DISORDERS OF VAGINA: ICD-10-CM

## 2022-08-02 VITALS
HEIGHT: 66 IN | BODY MASS INDEX: 21.61 KG/M2 | TEMPERATURE: 97.6 F | WEIGHT: 134.48 LBS | HEART RATE: 70 BPM | DIASTOLIC BLOOD PRESSURE: 70 MMHG | SYSTOLIC BLOOD PRESSURE: 120 MMHG

## 2022-08-17 ENCOUNTER — NON-APPOINTMENT (OUTPATIENT)
Age: 72
End: 2022-08-17

## 2022-08-17 ENCOUNTER — APPOINTMENT (OUTPATIENT)
Dept: UROGYNECOLOGY | Facility: CLINIC | Age: 72
End: 2022-08-17

## 2022-08-17 ENCOUNTER — APPOINTMENT (OUTPATIENT)
Dept: CARDIOLOGY | Facility: CLINIC | Age: 72
End: 2022-08-17

## 2022-08-17 PROCEDURE — 93294 REM INTERROG EVL PM/LDLS PM: CPT

## 2022-08-17 PROCEDURE — 93296 REM INTERROG EVL PM/IDS: CPT

## 2022-09-06 ENCOUNTER — APPOINTMENT (OUTPATIENT)
Dept: CARDIOLOGY | Facility: CLINIC | Age: 72
End: 2022-09-06

## 2022-09-06 PROCEDURE — 93306 TTE W/DOPPLER COMPLETE: CPT

## 2022-09-12 ENCOUNTER — OUTPATIENT (OUTPATIENT)
Dept: OUTPATIENT SERVICES | Facility: HOSPITAL | Age: 72
LOS: 1 days | Discharge: HOME | End: 2022-09-12

## 2022-09-12 DIAGNOSIS — Z98.890 OTHER SPECIFIED POSTPROCEDURAL STATES: Chronic | ICD-10-CM

## 2022-09-12 DIAGNOSIS — E05.90 THYROTOXICOSIS, UNSPECIFIED WITHOUT THYROTOXIC CRISIS OR STORM: ICD-10-CM

## 2022-09-13 ENCOUNTER — RESULT REVIEW (OUTPATIENT)
Age: 72
End: 2022-09-13

## 2022-09-13 PROCEDURE — 78014 THYROID IMAGING W/BLOOD FLOW: CPT | Mod: 26,MH

## 2022-10-12 ENCOUNTER — NON-APPOINTMENT (OUTPATIENT)
Age: 72
End: 2022-10-12

## 2022-10-13 ENCOUNTER — APPOINTMENT (OUTPATIENT)
Dept: CARDIOLOGY | Facility: CLINIC | Age: 72
End: 2022-10-13

## 2022-10-13 PROCEDURE — 99214 OFFICE O/P EST MOD 30 MIN: CPT | Mod: 95

## 2022-10-13 PROCEDURE — 99204 OFFICE O/P NEW MOD 45 MIN: CPT | Mod: 95

## 2022-11-16 ENCOUNTER — APPOINTMENT (OUTPATIENT)
Dept: CARDIOLOGY | Facility: CLINIC | Age: 72
End: 2022-11-16

## 2022-11-16 ENCOUNTER — NON-APPOINTMENT (OUTPATIENT)
Age: 72
End: 2022-11-16

## 2022-11-16 PROCEDURE — 93296 REM INTERROG EVL PM/IDS: CPT

## 2022-11-16 PROCEDURE — 93294 REM INTERROG EVL PM/LDLS PM: CPT

## 2022-12-13 NOTE — PRE-ANESTHESIA EVALUATION ADULT - NSANTHBMIRD_ENT_A_CORE
No
Pt lives with her  and 2 adult sons in a private home, 0 steps to enter. Pt's bedroom is on the 2nd floor, however pt she will be staying on the first floor upon discharge to home. Pt has a tub./children/spouse

## 2022-12-21 ENCOUNTER — NON-APPOINTMENT (OUTPATIENT)
Age: 72
End: 2022-12-21

## 2022-12-23 NOTE — DISCUSSION/SUMMARY
[FreeTextEntry1] : pt staying in [place] for winter \par to call with any issues \par echo \par bloodwork for valve issues and sob

## 2022-12-23 NOTE — REVIEW OF SYSTEMS
[FreeTextEntry5] : see hpi  [FreeTextEntry6] : see hpi  Normal volume, rate, productivity, spontaneity and articulation

## 2022-12-23 NOTE — HISTORY OF PRESENT ILLNESS
[FreeTextEntry1] : PT WITH H/O HTN, CAD S/P PCI IN PAST, CABG H/O HCM S/P MYOMECTOMY 7/21 PT HAD PRIOR MYOMECTOMY MANY YEARS AGO.  pt had NSTEMI patent lima to lad and svg to rpda open ppm 4/22 \par \par pt back in [place] and c/o's of shortness of breath, pt had recent laryngitis and now improved. \par echo reviewed with Mild LVH, decreased LVEF 45% with anteroseptal and apical hypokinesis, mod LAE, increased RV wall thickness, \par Moderate AR, PHT: 291 ms \par LVOT stacey: 0.96 m/s \par \par pt is nyha class 2. \par \par pt agrees to telehealth as in [place]. pt lives there in the winter\par \par 12/23/22: \par d/w pt on phone who is currently in [place], pt not feeling well and complaints of numbness both arms and pt has some pain under left breast, advised to see a cardio there and get evaluated but says not like CABG symptoms so not likely ischemic.

## 2023-02-15 ENCOUNTER — APPOINTMENT (OUTPATIENT)
Dept: CARDIOLOGY | Facility: CLINIC | Age: 73
End: 2023-02-15
Payer: MEDICARE

## 2023-02-15 ENCOUNTER — NON-APPOINTMENT (OUTPATIENT)
Age: 73
End: 2023-02-15

## 2023-02-15 PROCEDURE — 93294 REM INTERROG EVL PM/LDLS PM: CPT

## 2023-02-15 PROCEDURE — 93296 REM INTERROG EVL PM/IDS: CPT

## 2023-03-22 ENCOUNTER — APPOINTMENT (OUTPATIENT)
Dept: CARDIOLOGY | Facility: CLINIC | Age: 73
End: 2023-03-22
Payer: MEDICARE

## 2023-03-22 PROCEDURE — 93306 TTE W/DOPPLER COMPLETE: CPT

## 2023-03-31 ENCOUNTER — APPOINTMENT (OUTPATIENT)
Dept: CARDIOLOGY | Facility: CLINIC | Age: 73
End: 2023-03-31
Payer: MEDICARE

## 2023-03-31 VITALS — WEIGHT: 132.13 LBS | HEIGHT: 66 IN | BODY MASS INDEX: 21.23 KG/M2

## 2023-03-31 VITALS — DIASTOLIC BLOOD PRESSURE: 82 MMHG | HEART RATE: 70 BPM | SYSTOLIC BLOOD PRESSURE: 132 MMHG

## 2023-03-31 PROCEDURE — 93000 ELECTROCARDIOGRAM COMPLETE: CPT

## 2023-03-31 PROCEDURE — 99214 OFFICE O/P EST MOD 30 MIN: CPT

## 2023-03-31 RX ORDER — CLOPIDOGREL 75 MG/1
75 TABLET, FILM COATED ORAL DAILY
Refills: 0 | Status: DISCONTINUED | COMMUNITY
End: 2023-03-31

## 2023-03-31 NOTE — CARDIOLOGY SUMMARY
[de-identified] : 3/22/23: \par 1. The left ventricular systolic function is mildly decreased with an ejection fraction of 44 %.\par 2. E' sept: .03 m/s E/e': 12.4 m/s E stacey: 0.41 m/s, LVOT stacey: 1.08 m/s.\par 3. The left atrium is mildly dilated.\par 4. The right atrium is mildly dilated.\par 5. Moderate to severe mitral regurgitation.\par 6. Mild aortic stenosis.\par 7. Mild calcification of the aortic valve leaflets.\par 8. Mildly reduced systolic function.\par 9. Moderate aortic regurgitation.\par 10. Pacer lead noted in the right atrium.\par 11. Thickened mitral valve leaflets.

## 2023-03-31 NOTE — HISTORY OF PRESENT ILLNESS
[FreeTextEntry1] : PT WITH H/O HTN, CAD S/P PCI IN PAST, CABG H/O HCM S/P MYOMECTOMY 7/21 PT HAD PRIOR MYOMECTOMY MANY YEARS AGO.  pt had NSTEMI patent lima to lad and svg to rpda open ppm 4/22  mild lv dysfunction. \par \par pt back in [place] and c/o's of shortness of breath, pt had recent laryngitis and now improved. \par echo reviewed with Mild LVH, decreased LVEF 45% with anteroseptal and apical hypokinesis, mod LAE, increased RV wall thickness, \par Moderate AR, PHT: 291 ms \par LVOT stacey: 0.96 m/s \par \par pt is nyha class 2. \par \par pt agrees to telehealth as in [place]. pt lives there in the winter\par \par 12/23/22: \par d/w pt on phone who is currently in [place], pt not feeling well and complaints of numbness both arms and pt has some pain under left breast, advised to see a cardio there and get evaluated but says not like CABG symptoms so not likely ischemic. \par \par 3/31/23: \par 3/23: echo: lvef 45% JOSE, increased LVFP, mod to severe MR \par gfr: 67 hdl 73 LDL 83  BNP ORDERED NOT DONE. \par pt had numbness and pain last time in 12/22 and w/u in [place], but has had no further and cp any further, pt says able to go up 2 flights of stairs. pt has ovarian mass preop for surgery at [facility]. \par

## 2023-03-31 NOTE — DISCUSSION/SUMMARY
[EKG obtained to assist in diagnosis and management of assessed problem(s)] : EKG obtained to assist in diagnosis and management of assessed problem(s) [FreeTextEntry1] : pt staying in [place] for winter \par to call with any issues \par echo \par bloodwork for valve issues and sob \par \par 3/31/23: \par pt mild sob but not severe \par mod to severe MR will f/u \par pt is intermediate risk of perioperative cv complications and cv stable for surgery.

## 2023-04-07 ENCOUNTER — APPOINTMENT (OUTPATIENT)
Dept: ELECTROPHYSIOLOGY | Facility: CLINIC | Age: 73
End: 2023-04-07
Payer: MEDICARE

## 2023-04-07 VITALS
SYSTOLIC BLOOD PRESSURE: 140 MMHG | RESPIRATION RATE: 16 BRPM | BODY MASS INDEX: 21.69 KG/M2 | DIASTOLIC BLOOD PRESSURE: 60 MMHG | WEIGHT: 135 LBS | HEART RATE: 71 BPM | HEIGHT: 66 IN | TEMPERATURE: 97.1 F

## 2023-04-07 PROCEDURE — 99213 OFFICE O/P EST LOW 20 MIN: CPT

## 2023-04-07 PROCEDURE — 93280 PM DEVICE PROGR EVAL DUAL: CPT

## 2023-04-07 PROCEDURE — 93000 ELECTROCARDIOGRAM COMPLETE: CPT | Mod: 59

## 2023-04-07 RX ORDER — MECLIZINE HYDROCHLORIDE 25 MG/1
25 TABLET ORAL DAILY
Refills: 0 | Status: ACTIVE | COMMUNITY

## 2023-04-07 RX ORDER — MULTIVITAMIN
TABLET ORAL DAILY
Refills: 0 | Status: ACTIVE | COMMUNITY

## 2023-04-07 RX ORDER — POTASSIUM CHLORIDE 1500 MG/1
20 TABLET, FILM COATED, EXTENDED RELEASE ORAL
Refills: 0 | Status: ACTIVE | COMMUNITY

## 2023-04-07 RX ORDER — FLUCONAZOLE 150 MG/1
150 TABLET ORAL
Qty: 2 | Refills: 0 | Status: ACTIVE | COMMUNITY
Start: 2022-07-22

## 2023-04-07 RX ORDER — ASPIRIN 81 MG/1
81 TABLET, CHEWABLE ORAL DAILY
Refills: 0 | Status: ACTIVE | COMMUNITY
Start: 2021-08-23

## 2023-04-07 RX ORDER — ATORVASTATIN CALCIUM 80 MG/1
80 TABLET, FILM COATED ORAL DAILY
Refills: 0 | Status: ACTIVE | COMMUNITY
Start: 2021-09-03

## 2023-04-07 RX ORDER — ALENDRONATE SODIUM 70 MG/1
70 TABLET ORAL WEEKLY
Refills: 0 | Status: ACTIVE | COMMUNITY

## 2023-05-04 NOTE — PROCEDURE
[No] : not [NSR] : normal sinus rhythm [See Device Printout] : See device printout [Pacemaker] : pacemaker [Longevity: ___ months] : The estimated remaining battery life is [unfilled] months [Threshold Testing Performed] : Threshold testing was performed [Lead Imp:  ___ohms] : lead impedance was [unfilled] ohms [Sensing Amplitude ___mv] : sensing amplitude was [unfilled] mv [___V @] : [unfilled] V [___ ms] : [unfilled] ms [Programmed for Longevity] : output reprogrammed for improved battery longevity [DDDR] : DDDR [de-identified] : SANDRA DAVEY DR MRI [de-identified] : W1DR01 [de-identified] : [id number] [de-identified] : 4/19/2022 [de-identified] : 60/130 [de-identified] : 11.3 years [de-identified] : AV delay extended to 250ms/200ms, partial + added [de-identified] :  99.3%\par AP 25.3%\par 2 brief NSVT, longest 8 beats max V 171 bpm\par \par 14 AT/AF events likely far-field oversensing, partial plus turned ON (36 minutes total)\par

## 2023-05-04 NOTE — ASSESSMENT
[FreeTextEntry1] : 73 years old female s/p Dual chamber pacemaker on 4/19/2022 for complete heart block\par  \par \par # Device interrogation - CHB\par \par - Device is working normally. All parameters stable. \par - She is on remote and transmitting.\par - Not dependent,  burden high. H/O AV block. AV delays increased to promote intrinsic rhythm.\par \par Fatigue\par - Patient still reports feeling fatigued. Increasing daily physical activity encouraged.\par - TFTs in January WNL. Hgb WNL.\par \par Continue same meds. \par \par \par RTO in 6 months with NP/MK

## 2023-05-04 NOTE — PHYSICAL EXAM
[General Appearance - Well Developed] : well developed [Well Groomed] : well groomed [General Appearance - Well Nourished] : well nourished [General Appearance - In No Acute Distress] : no acute distress [Heart Rate And Rhythm] : heart rate and rhythm were normal [Heart Sounds] : normal S1 and S2 [Edema] : no peripheral edema present [] : no respiratory distress [Respiration, Rhythm And Depth] : normal respiratory rhythm and effort [Auscultation Breath Sounds / Voice Sounds] : lungs were clear to auscultation bilaterally [Left Infraclavicular] : left infraclavicular area [Clean] : clean [Dry] : dry [Well-Healed] : well-healed [Abdomen Soft] : soft [Nail Clubbing] : no clubbing of the fingernails [Cyanosis, Localized] : no localized cyanosis [Bleeding] : no active bleeding [Purulent Drainage] : no purulent drainage [Serous Drainage] : no serous drainage [Erythema] : not erythematous [Tender] : not tender

## 2023-05-04 NOTE — HISTORY OF PRESENT ILLNESS
[de-identified] : Cardio: Dr. Nguyen\par \par \par 73 years old female admitted in [facility] on 4/18 thru ED in Bradycardia with complete AVB. Was intubated and  TVP was inserted. \par PMH includes HTN, osteoporosis, CAD with multiple PCI 5-6 years ago. She had  CABG (at [facility]) and as per patient she had a myomectomy done at same time in [facility] July 2021.\par Echo on 4/18 showed EF of 55% with moderate to severe MR. She was taken to the cardiac cath lab for NSTEMI found to have patent SVG to RPDA and patent LIMA to LAD.\par Once stable and extubated she received a dual chamber PPM on 4/19 by Dr. Banks.\par \par Cardiologist: Dr. Tran and Dr. Nugyen\par She travels to and from [place] several times a year. Going to see Dr. Cantrell In [place] for EP. \par \par She presents today for wound check and device interrogation. Of note, she will be getting GYN surgery at [facility] this April 2023. \par Denies any sob, TORRE, PND, orthopnea, no palpitations, no dizziness,lightheadedness, denies chest pain.\par She reports feeling more fatigued.\par \par

## 2023-06-22 ENCOUNTER — APPOINTMENT (OUTPATIENT)
Dept: CARDIOLOGY | Facility: CLINIC | Age: 73
End: 2023-06-22
Payer: MEDICARE

## 2023-06-22 VITALS — WEIGHT: 128 LBS | BODY MASS INDEX: 20.57 KG/M2 | HEIGHT: 66 IN

## 2023-06-22 VITALS — SYSTOLIC BLOOD PRESSURE: 120 MMHG | DIASTOLIC BLOOD PRESSURE: 80 MMHG | HEART RATE: 60 BPM

## 2023-06-22 PROCEDURE — 99214 OFFICE O/P EST MOD 30 MIN: CPT

## 2023-06-22 NOTE — HISTORY OF PRESENT ILLNESS
[FreeTextEntry1] : PT WITH H/O HTN, CAD S/P PCI IN PAST, CABG H/O HCM S/P MYOMECTOMY  PT HAD PRIOR MYOMECTOMY MANY YEARS AGO.  pt had NSTEMI patent lima to lad and svg to rpda open ppm   mild lv dysfunction. \par \par pt back in [place] and c/o's of shortness of breath, pt had recent laryngitis and now improved. \par echo reviewed with Mild LVH, decreased LVEF 45% with anteroseptal and apical hypokinesis, mod LAE, increased RV wall thickness, \par Moderate AR, PHT: 291 ms \par LVOT stacey: 0.96 m/s \par \par pt is nyha class 2. \par \par pt agrees to telehealth as in [place]. pt lives there in the winter\par \par 22: \par d/w pt on phone who is currently in [place], pt not feeling well and complaints of numbness both arms and pt has some pain under left breast, advised to see a cardio there and get evaluated but says not like CABG symptoms so not likely ischemic. \par \par 3/31/23: \par 3/23: echo: lvef 45% JOSE, increased LVFP, mod to severe MR \par gfr: 67 hdl 73 LDL 83  BNP ORDERED NOT DONE. \par pt had numbness and pain last time in  and w/u in [place], but has had no further and cp any further, pt says able to go up 2 flights of stairs. pt has ovarian mass preop for surgery at [facility]. \par \par 23:\par BNP: 478, HDL: 71, T, LDL: 86, \par Patient c/o sob going up hill. Patient denies cp. Patient states she does not have a car so she is always walking. \par Patient had echo on 23 from [facility].\par Pt had hysterectomy and concerned about getting valvular surgery. pt ahd lvef of 45% and concerned.

## 2023-06-22 NOTE — PHYSICAL EXAM
[Normal Venous Pressure] : normal venous pressure [Normal S1, S2] : normal S1, S2 [Clear Lung Fields] : clear lung fields [Soft] : abdomen soft [No Edema] : no edema [de-identified] : RRR

## 2023-06-22 NOTE — CARDIOLOGY SUMMARY
[de-identified] : 3/22/23: \par 1. The left ventricular systolic function is mildly decreased with an ejection fraction of 44 %.\par 2. E' sept: .03 m/s E/e': 12.4 m/s E stacey: 0.41 m/s, LVOT stacey: 1.08 m/s.\par 3. The left atrium is mildly dilated.\par 4. The right atrium is mildly dilated.\par 5. Moderate to severe mitral regurgitation.\par 6. Mild aortic stenosis.\par 7. Mild calcification of the aortic valve leaflets.\par 8. Mildly reduced systolic function.\par 9. Moderate aortic regurgitation.\par 10. Pacer lead noted in the right atrium.\par 11. Thickened mitral valve leaflets.

## 2023-06-22 NOTE — DISCUSSION/SUMMARY
[FreeTextEntry1] : 3/31/23: \par pt mild sob but not severe \par mod to severe MR will f/u \par pt is intermediate risk of perioperative cv complications and cv stable for surgery. \par \par 6/22/23:\par Pt says not sob at this time \par pt mod to severe MR \par and lvef 45% \par get 2 d echo \par f/u in september as going to [place] now.

## 2023-07-11 ENCOUNTER — APPOINTMENT (OUTPATIENT)
Dept: CARDIOLOGY | Facility: CLINIC | Age: 73
End: 2023-07-11
Payer: MEDICARE

## 2023-07-11 ENCOUNTER — NON-APPOINTMENT (OUTPATIENT)
Age: 73
End: 2023-07-11

## 2023-07-11 PROCEDURE — 93296 REM INTERROG EVL PM/IDS: CPT

## 2023-07-11 PROCEDURE — 93294 REM INTERROG EVL PM/LDLS PM: CPT

## 2023-09-18 ENCOUNTER — APPOINTMENT (OUTPATIENT)
Dept: ELECTROPHYSIOLOGY | Facility: CLINIC | Age: 73
End: 2023-09-18
Payer: MEDICARE

## 2023-09-18 VITALS
DIASTOLIC BLOOD PRESSURE: 69 MMHG | TEMPERATURE: 98 F | HEART RATE: 67 BPM | SYSTOLIC BLOOD PRESSURE: 164 MMHG | HEIGHT: 66 IN | BODY MASS INDEX: 20.57 KG/M2 | WEIGHT: 128 LBS

## 2023-09-18 DIAGNOSIS — Z45.018 ENCOUNTER FOR ADJUSTMENT AND MANAGEMENT OF OTHER PART OF CARDIAC PACEMAKER: ICD-10-CM

## 2023-09-18 PROCEDURE — 99214 OFFICE O/P EST MOD 30 MIN: CPT

## 2023-09-18 PROCEDURE — 93280 PM DEVICE PROGR EVAL DUAL: CPT

## 2023-09-25 ENCOUNTER — APPOINTMENT (OUTPATIENT)
Dept: CARDIOLOGY | Facility: CLINIC | Age: 73
End: 2023-09-25
Payer: MEDICARE

## 2023-09-25 VITALS — HEIGHT: 66 IN | WEIGHT: 128 LBS | BODY MASS INDEX: 20.57 KG/M2

## 2023-09-25 PROCEDURE — 99214 OFFICE O/P EST MOD 30 MIN: CPT

## 2023-09-25 PROCEDURE — 93306 TTE W/DOPPLER COMPLETE: CPT

## 2023-12-18 ENCOUNTER — NON-APPOINTMENT (OUTPATIENT)
Age: 73
End: 2023-12-18

## 2023-12-18 ENCOUNTER — APPOINTMENT (OUTPATIENT)
Dept: CARDIOLOGY | Facility: CLINIC | Age: 73
End: 2023-12-18
Payer: MEDICARE

## 2023-12-18 PROCEDURE — 93294 REM INTERROG EVL PM/LDLS PM: CPT

## 2023-12-18 PROCEDURE — 93296 REM INTERROG EVL PM/IDS: CPT

## 2024-03-18 ENCOUNTER — NON-APPOINTMENT (OUTPATIENT)
Age: 74
End: 2024-03-18

## 2024-03-18 ENCOUNTER — APPOINTMENT (OUTPATIENT)
Dept: CARDIOLOGY | Facility: CLINIC | Age: 74
End: 2024-03-18
Payer: MEDICARE

## 2024-03-18 PROCEDURE — 93296 REM INTERROG EVL PM/IDS: CPT

## 2024-03-18 PROCEDURE — 93294 REM INTERROG EVL PM/LDLS PM: CPT

## 2024-03-26 ENCOUNTER — APPOINTMENT (OUTPATIENT)
Dept: CARDIOLOGY | Facility: CLINIC | Age: 74
End: 2024-03-26
Payer: MEDICARE

## 2024-03-26 VITALS — HEIGHT: 66 IN | WEIGHT: 130 LBS | BODY MASS INDEX: 20.89 KG/M2

## 2024-03-26 VITALS — HEART RATE: 70 BPM | SYSTOLIC BLOOD PRESSURE: 150 MMHG | DIASTOLIC BLOOD PRESSURE: 80 MMHG

## 2024-03-26 PROCEDURE — 99214 OFFICE O/P EST MOD 30 MIN: CPT

## 2024-03-26 RX ORDER — METOPROLOL SUCCINATE 25 MG/1
25 TABLET, EXTENDED RELEASE ORAL
Refills: 0 | Status: ACTIVE | COMMUNITY
Start: 2022-05-18

## 2024-03-27 ENCOUNTER — APPOINTMENT (OUTPATIENT)
Dept: CARDIOLOGY | Facility: CLINIC | Age: 74
End: 2024-03-27
Payer: MEDICARE

## 2024-03-27 LAB — NT-PROBNP SERPL-MCNC: 4033 PG/ML

## 2024-03-27 PROCEDURE — 93306 TTE W/DOPPLER COMPLETE: CPT

## 2024-03-27 NOTE — HISTORY OF PRESENT ILLNESS
[FreeTextEntry1] : PT with HTN, CAD S/P PCI IN PAST, CABG  HCM S/P MYOMECTOMY  [facility], PT HAD PRIOR MYOMECTOMY MANY YEARS AGO.  pt had NSTEMI patent lima to lad and svg to rpda and  ppm   mild lv dysfunction.   :  Mild LVH, decreased LVEF 45% with anteroseptal and apical hypokinesis, mod LAE, increased RV wall thickness, Moderate AR, PHT: 291 ms LVOT stacey: 0.96 m/s  pt is nyha class 2.    3/31/23:  3/23: echo: lvef 45% JOSE, increased LVFP, mod to severe MR  gfr: 67 hdl 73 LDL 83  BNP ORDERED NOT DONE.  pt had numbness and pain last time in  and w/u in [place], but has had no further and cp any further, pt says able to go up 2 flights of stairs. pt has ovarian mass preop for surgery at [facility].   23: BNP: 478, HDL: 71, T, LDL: 86,  Patient c/o sob going up hill. Patient denies cp. Patient states she does not have a car so she is always walking.  Patient had echo on 23 from [facility]. Pt had hysterectomy and concerned about getting valvular surgery. pt had lvef of 45% and concerned.   23: Echo: lvef 50%, apical hypokinesis, aortic regurgitation moderate/aortic stenosis, lvef 60%, pt saw dr. quijano and doing well. pt sob going up a hill.   3/25/24:  3/24: gfr: 62 ast/alt 40/37 mildly elevated, BNP: 2125 increased bun/cr: 24/0.96 LDL: 79 A1c: 5.4  pt back from [place] and says sob with walking up hills and says [place] was better but was driving more than.  pt had metoprolol lowered by DR. GEORGE.  pt bnp elevated  was bnp: 478, pt wants to go back [place].   24: 3/26/24: BNP: 4033. Start Furosemide 20mg for 4 days, repeat BNP.

## 2024-03-27 NOTE — PHYSICAL EXAM
[Normal Venous Pressure] : normal venous pressure [Normal S1, S2] : normal S1, S2 [Clear Lung Fields] : clear lung fields [Soft] : abdomen soft [No Edema] : no edema [de-identified] : RRR

## 2024-03-27 NOTE — CARDIOLOGY SUMMARY
[de-identified] : 3/22/23: \par  1. The left ventricular systolic function is mildly decreased with an ejection fraction of 44 %.\par  2. E' sept: .03 m/s E/e': 12.4 m/s E stacey: 0.41 m/s, LVOT stacey: 1.08 m/s.\par  3. The left atrium is mildly dilated.\par  4. The right atrium is mildly dilated.\par  5. Moderate to severe mitral regurgitation.\par  6. Mild aortic stenosis.\par  7. Mild calcification of the aortic valve leaflets.\par  8. Mildly reduced systolic function.\par  9. Moderate aortic regurgitation.\par  10. Pacer lead noted in the right atrium.\par  11. Thickened mitral valve leaflets.

## 2024-03-27 NOTE — DISCUSSION/SUMMARY
[FreeTextEntry1] : 6/22/23:mod to severe MR and lvef 45%  mod to severe mr in past, moderate mr 9/23 now so sees volume related  pt to continue metoprolol er 50 mg po qd  continue atorvastatin 80 mg po qhs  continue aspirin  get bloodwork and 2 d echo  f/u on monday. start zetia.

## 2024-04-01 ENCOUNTER — APPOINTMENT (OUTPATIENT)
Dept: CARDIOLOGY | Facility: CLINIC | Age: 74
End: 2024-04-01
Payer: MEDICARE

## 2024-04-01 VITALS — HEIGHT: 66 IN | BODY MASS INDEX: 21.38 KG/M2 | WEIGHT: 133 LBS

## 2024-04-01 DIAGNOSIS — Z00.00 ENCOUNTER FOR GENERAL ADULT MEDICAL EXAMINATION W/OUT ABNORMAL FINDINGS: ICD-10-CM

## 2024-04-01 PROCEDURE — 99214 OFFICE O/P EST MOD 30 MIN: CPT

## 2024-04-02 LAB
ALBUMIN SERPL ELPH-MCNC: 4.3 G/DL
ALP BLD-CCNC: 103 U/L
ALT SERPL-CCNC: 39 U/L
ANION GAP SERPL CALC-SCNC: 14 MMOL/L
AST SERPL-CCNC: 42 U/L
BILIRUB SERPL-MCNC: 0.7 MG/DL
BUN SERPL-MCNC: 17 MG/DL
CALCIUM SERPL-MCNC: 9.5 MG/DL
CHLORIDE SERPL-SCNC: 98 MMOL/L
CO2 SERPL-SCNC: 27 MMOL/L
CREAT SERPL-MCNC: 0.81 MG/DL
EGFR: 76 ML/MIN/1.73M2
GLUCOSE SERPL-MCNC: 107 MG/DL
NT-PROBNP SERPL-MCNC: 3122 PG/ML
POTASSIUM SERPL-SCNC: 4 MMOL/L
PROT SERPL-MCNC: 7 G/DL
SODIUM SERPL-SCNC: 139 MMOL/L

## 2024-04-08 ENCOUNTER — RX RENEWAL (OUTPATIENT)
Age: 74
End: 2024-04-08

## 2024-05-07 ENCOUNTER — APPOINTMENT (OUTPATIENT)
Dept: CARDIOLOGY | Facility: CLINIC | Age: 74
End: 2024-05-07
Payer: MEDICARE

## 2024-05-07 VITALS — BODY MASS INDEX: 20.89 KG/M2 | HEIGHT: 66 IN | WEIGHT: 130 LBS

## 2024-05-07 VITALS — HEART RATE: 81 BPM | SYSTOLIC BLOOD PRESSURE: 140 MMHG | DIASTOLIC BLOOD PRESSURE: 60 MMHG

## 2024-05-07 DIAGNOSIS — I65.23 OCCLUSION AND STENOSIS OF BILATERAL CAROTID ARTERIES: ICD-10-CM

## 2024-05-07 DIAGNOSIS — Z95.0 PRESENCE OF CARDIAC PACEMAKER: ICD-10-CM

## 2024-05-07 DIAGNOSIS — R06.02 SHORTNESS OF BREATH: ICD-10-CM

## 2024-05-07 PROCEDURE — 99214 OFFICE O/P EST MOD 30 MIN: CPT

## 2024-05-07 PROCEDURE — G2211 COMPLEX E/M VISIT ADD ON: CPT

## 2024-05-07 PROCEDURE — 93000 ELECTROCARDIOGRAM COMPLETE: CPT

## 2024-05-07 NOTE — PHYSICAL EXAM
[Normal Venous Pressure] : normal venous pressure [Normal S1, S2] : normal S1, S2 [Clear Lung Fields] : clear lung fields [Soft] : abdomen soft [No Edema] : no edema [de-identified] : RRR

## 2024-05-07 NOTE — HISTORY OF PRESENT ILLNESS
[FreeTextEntry1] : PT with HTN, CAD S/P PCI IN PAST, CABG HCM S/P MYOMECTOMY  [facility], PT HAD PRIOR MYOMECTOMY MANY YEARS AGO.  pt had NSTEMI patent lima to lad and svg to rpda and  ppm   mild lv dysfunction.   :  Mild LVH, decreased LVEF 45% with anteroseptal and apical hypokinesis, mod LAE, increased RV wall thickness, Moderate AR, PHT: 291 ms LVOT stacey: 0.96 m/s  pt is nyha class 2.    3/31/23:  3/23: echo: lvef 45% JOSE, increased LVFP, mod to severe MR  gfr: 67 hdl 73 LDL 83  BNP ORDERED NOT DONE.  pt had numbness and pain last time in  and w/u in [place], but has had no further and cp any further, pt says able to go up 2 flights of stairs. pt has ovarian mass preop for surgery at [facility].   23: BNP: 478, HDL: 71, T, LDL: 86,  Patient c/o sob going up hill. Patient denies cp. Patient states she does not have a car so she is always walking.  Patient had echo on 23 from [facility]. Pt had hysterectomy and concerned about getting valvular surgery. pt had lvef of 45% and concerned.   23: Echo: lvef 50%, apical hypokinesis, aortic regurgitation moderate/aortic stenosis, lvef 60%, pt saw dr. quijano and doing well. pt sob going up a hill.   3/25/24:  3/1/24: gfr: 62 ast/alt 40/37 mildly elevated, BNP: 2125 increased bun/cr: 24/0.96 LDL: 79 A1c: 5.4  pt back from [place] and says sob with walking up hills and says [place] was better but was driving more than.  pt had metoprolol lowered by DR. GEORGE.  pt bnp elevated  was bnp: 478, pt wants to go back [place].   24: 3/26/24: BNP: 4033. increased vs 2125  Start Furosemide 20mg for 4 days and was to repeat bnp  Echo: Lvef: 48%, apical hypokinesis, mild  to mod AR, mod to SEVERE MR, MILD TO MOD AS peak e wave nwv and A wave: 1 m/s, elevated lvedp, lvot vit; 11.3 cm, rv tapse: 1.5 cm, JOSE, TR stacey normal.  pt currently taking water pill daily.  24: BNP: 3122   24: Patient called office stating she is having SOB. Patient also c/o fatigue and numbness in her arm. Patient states taking Furosemide 20mg daily. Patient states sob is worsening.

## 2024-05-07 NOTE — PHYSICAL EXAM
[Normal Venous Pressure] : normal venous pressure [Normal S1, S2] : normal S1, S2 [Clear Lung Fields] : clear lung fields [Soft] : abdomen soft [No Edema] : no edema [de-identified] : RRR

## 2024-05-07 NOTE — HISTORY OF PRESENT ILLNESS
[FreeTextEntry1] : PT with HTN, CAD S/P PCI IN PAST, CABG HCM S/P MYOMECTOMY  [facility], PT HAD PRIOR MYOMECTOMY MANY YEARS AGO.  pt had NSTEMI patent lima to lad and svg to rpda and  ppm   mild lv dysfunction.   :  Mild LVH, decreased LVEF 45% with anteroseptal and apical hypokinesis, mod LAE, increased RV wall thickness, Moderate AR, PHT: 291 ms LVOT stacey: 0.96 m/s  pt is nyha class 2.    3/31/23:  3/23: echo: lvef 45% JOSE, increased LVFP, mod to severe MR  gfr: 67 hdl 73 LDL 83  BNP ORDERED NOT DONE.  pt had numbness and pain last time in  and w/u in [place], but has had no further and cp any further, pt says able to go up 2 flights of stairs. pt has ovarian mass preop for surgery at [facility].   23: BNP: 478, HDL: 71, T, LDL: 86,  Patient c/o sob going up hill. Patient denies cp. Patient states she does not have a car so she is always walking.  Patient had echo on 23 from [facility]. Pt had hysterectomy and concerned about getting valvular surgery. pt had lvef of 45% and concerned.   23: Echo: lvef 50%, apical hypokinesis, aortic regurgitation moderate/aortic stenosis, lvef 60%, pt saw dr. quijano and doing well. pt sob going up a hill.   3/25/24:  3/1/24: gfr: 62 ast/alt 40/37 mildly elevated, BNP: 2125 increased bun/cr: 24/0.96 LDL: 79 A1c: 5.4  pt back from [place] and says sob with walking up hills and says [place] was better but was driving more than.  pt had metoprolol lowered by DR. GEORGE.  pt bnp elevated  was bnp: 478, pt wants to go back [place].   24: 3/26/24: BNP: 4033. increased vs 2125  Start Furosemide 20mg for 4 days and was to repeat bnp  Echo: Lvef: 48%, apical hypokinesis, mild  to mod AR, mod to SEVERE MR, MILD TO MOD AS peak e wave nwv and A wave: 1 m/s, elevated lvedp, lvot vit; 11.3 cm, rv tapse: 1.5 cm, JOSE, TR stacey normal.  pt currently taking water pill daily.  24: BNP: 3122    24: Patient comes in emergently because patient called office stating she is having SOB. Patient also c/o fatigue and numbness in her arm. Patient states taking Furosemide 20mg daily. Patient states sob is worsening.   pt sob with exerting herself. pt fatigued and cometose by 8:00 pm at night. pt sob and worsening. pt stopped 3 times on parking lot yesterday.  EKG: paced rhythm.

## 2024-05-07 NOTE — DISCUSSION/SUMMARY
[FreeTextEntry1] : mod to severe MR and lvef 48% in 6/22  pt to continue metoprolol er 50 mg po qd  continue atorvastatin 80 mg po qhs  continue aspirin 81  elevated bnp but not symptomatic  use furosemide as needed or every other day.  if symptomatic will proceed with further MR w/u.  bmp bnp  f/u in september.

## 2024-05-07 NOTE — CARDIOLOGY SUMMARY
[de-identified] : 3/22/23: \par  1. The left ventricular systolic function is mildly decreased with an ejection fraction of 44 %.\par  2. E' sept: .03 m/s E/e': 12.4 m/s E stacey: 0.41 m/s, LVOT stacey: 1.08 m/s.\par  3. The left atrium is mildly dilated.\par  4. The right atrium is mildly dilated.\par  5. Moderate to severe mitral regurgitation.\par  6. Mild aortic stenosis.\par  7. Mild calcification of the aortic valve leaflets.\par  8. Mildly reduced systolic function.\par  9. Moderate aortic regurgitation.\par  10. Pacer lead noted in the right atrium.\par  11. Thickened mitral valve leaflets.

## 2024-05-07 NOTE — CARDIOLOGY SUMMARY
[de-identified] : 3/22/23: \par  1. The left ventricular systolic function is mildly decreased with an ejection fraction of 44 %.\par  2. E' sept: .03 m/s E/e': 12.4 m/s E stacey: 0.41 m/s, LVOT stacey: 1.08 m/s.\par  3. The left atrium is mildly dilated.\par  4. The right atrium is mildly dilated.\par  5. Moderate to severe mitral regurgitation.\par  6. Mild aortic stenosis.\par  7. Mild calcification of the aortic valve leaflets.\par  8. Mildly reduced systolic function.\par  9. Moderate aortic regurgitation.\par  10. Pacer lead noted in the right atrium.\par  11. Thickened mitral valve leaflets.

## 2024-05-07 NOTE — DISCUSSION/SUMMARY
[FreeTextEntry1] : mod to severe MR and lvef 48% in 6/22  pt to continue metoprolol er 50 mg po qd  continue atorvastatin 80 mg po qhs  continue aspirin 81  elevated bnp but not symptomatic  use furosemide 40 for now, was on 20 daily  bnp bmp tsh  if symptomatic will proceed with further MR w/u.  next week.  [EKG obtained to assist in diagnosis and management of assessed problem(s)] : EKG obtained to assist in diagnosis and management of assessed problem(s)

## 2024-05-13 LAB
ALBUMIN SERPL ELPH-MCNC: 4.7 G/DL
ALP BLD-CCNC: 89 U/L
ALT SERPL-CCNC: 37 U/L
ANION GAP SERPL CALC-SCNC: 14 MMOL/L
AST SERPL-CCNC: 37 U/L
BILIRUB SERPL-MCNC: 1 MG/DL
BUN SERPL-MCNC: 23 MG/DL
CALCIUM SERPL-MCNC: 9.9 MG/DL
CHLORIDE SERPL-SCNC: 98 MMOL/L
CO2 SERPL-SCNC: 28 MMOL/L
CREAT SERPL-MCNC: 0.87 MG/DL
EGFR: 70 ML/MIN/1.73M2
GLUCOSE SERPL-MCNC: 165 MG/DL
NT-PROBNP SERPL-MCNC: 1756 PG/ML
POTASSIUM SERPL-SCNC: 4.6 MMOL/L
PROT SERPL-MCNC: 7.6 G/DL
SODIUM SERPL-SCNC: 140 MMOL/L
TSH SERPL-ACNC: 1.68 UIU/ML

## 2024-05-14 ENCOUNTER — APPOINTMENT (OUTPATIENT)
Dept: CARDIOLOGY | Facility: CLINIC | Age: 74
End: 2024-05-14
Payer: MEDICARE

## 2024-05-14 VITALS — HEIGHT: 66 IN | WEIGHT: 129 LBS | BODY MASS INDEX: 20.73 KG/M2

## 2024-05-14 VITALS — HEART RATE: 78 BPM | DIASTOLIC BLOOD PRESSURE: 58 MMHG | SYSTOLIC BLOOD PRESSURE: 118 MMHG

## 2024-05-14 DIAGNOSIS — I44.2 ATRIOVENTRICULAR BLOCK, COMPLETE: ICD-10-CM

## 2024-05-14 DIAGNOSIS — I51.7 CARDIOMEGALY: ICD-10-CM

## 2024-05-14 DIAGNOSIS — I25.810 ATHEROSCLEROSIS OF CORONARY ARTERY BYPASS GRAFT(S) W/OUT ANGINA PECTORIS: ICD-10-CM

## 2024-05-14 DIAGNOSIS — I51.9 HEART DISEASE, UNSPECIFIED: ICD-10-CM

## 2024-05-14 DIAGNOSIS — I25.2 OLD MYOCARDIAL INFARCTION: ICD-10-CM

## 2024-05-14 DIAGNOSIS — I35.1 NONRHEUMATIC AORTIC (VALVE) INSUFFICIENCY: ICD-10-CM

## 2024-05-14 DIAGNOSIS — I10 ESSENTIAL (PRIMARY) HYPERTENSION: ICD-10-CM

## 2024-05-14 DIAGNOSIS — I42.2 OTHER HYPERTROPHIC CARDIOMYOPATHY: ICD-10-CM

## 2024-05-14 DIAGNOSIS — I34.0 NONRHEUMATIC MITRAL (VALVE) INSUFFICIENCY: ICD-10-CM

## 2024-05-14 PROCEDURE — G2211 COMPLEX E/M VISIT ADD ON: CPT

## 2024-05-14 PROCEDURE — 99214 OFFICE O/P EST MOD 30 MIN: CPT

## 2024-05-14 RX ORDER — EZETIMIBE 10 MG/1
10 TABLET ORAL
Qty: 90 | Refills: 3 | Status: ACTIVE | COMMUNITY
Start: 2024-03-26 | End: 1900-01-01

## 2024-05-14 RX ORDER — FUROSEMIDE 40 MG/1
40 TABLET ORAL DAILY
Qty: 90 | Refills: 3 | Status: ACTIVE | COMMUNITY
Start: 2024-03-27 | End: 1900-01-01

## 2024-05-14 NOTE — DISCUSSION/SUMMARY
[FreeTextEntry1] : mod to severe MR and lvef 48% in 6/22  pt to continue metoprolol er 50 mg po qd  continue atorvastatin 80 mg po qhs  continue aspirin 81  elevated bnp but not symptomatic  use furosemide 40 daily as feeling ok and d/w patient to adjust based on weight.  MR stable and improved if worsened will get further w/u.  pt has f/u in september  bloodwork get carotid

## 2024-05-14 NOTE — HISTORY OF PRESENT ILLNESS
[FreeTextEntry1] : PT with HTN, CAD S/P PCI IN PAST, CABG HCM S/P MYOMECTOMY  [facility], PT HAD PRIOR MYOMECTOMY MANY YEARS AGO.  pt had NSTEMI patent lima to lad and svg to rpda and  ppm   mild lv dysfunction.   :  Mild LVH, decreased LVEF 45% with anteroseptal and apical hypokinesis, mod LAE, increased RV wall thickness, Moderate AR, PHT: 291 ms LVOT stacey: 0.96 m/s  pt is nyha class 2.    3/31/23:  3/23: echo: lvef 45% JOSE, increased LVFP, mod to severe MR  gfr: 67 hdl 73 LDL 83  BNP ORDERED NOT DONE.  pt had numbness and pain last time in  and w/u in [place], but has had no further and cp any further, pt says able to go up 2 flights of stairs. pt has ovarian mass preop for surgery at [facility].   23: BNP: 478, HDL: 71, T, LDL: 86,  Patient c/o sob going up hill. Patient denies cp. Patient states she does not have a car so she is always walking.  Patient had echo on 23 from [facility]. Pt had hysterectomy and concerned about getting valvular surgery. pt had lvef of 45% and concerned.   23: Echo: lvef 50%, apical hypokinesis, aortic regurgitation moderate/aortic stenosis, lvef 60%, pt saw dr. quijano and doing well. pt sob going up a hill.   3/25/24:  3/1/24: gfr: 62 ast/alt 40/37 mildly elevated, BNP: 2125 increased bun/cr: 24/0.96 LDL: 79 A1c: 5.4  pt back from [place] and says sob with walking up hills and says [place] was better but was driving more than.  pt had metoprolol lowered by DR. GEORGE.  pt bnp elevated  was bnp: 478, pt wants to go back [place].   24: 3/26/24: BNP: 4033. increased vs 2125  Start Furosemide 20mg for 4 days and was to repeat bnp  Echo: Lvef: 48%, apical hypokinesis, mild  to mod AR, mod to SEVERE MR, MILD TO MOD AS peak e wave nwv and A wave: 1 m/s, elevated lvedp, lvot vit; 11.3 cm, rv tapse: 1.5 cm, JOSE, TR stacey normal.  pt currently taking water pill daily.  24: BNP: 3122   24: Patient comes in emergently because patient called office stating she is having SOB. Patient also c/o fatigue and numbness in her arm. Patient states taking Furosemide 20mg daily. Patient states sob is worsening.   pt sob with exerting herself. pt fatigued and cometose by 8:00 pm at night. pt sob and worsening. pt stopped 3 times on parking lot yesterday.  EKG: paced rhythm.   24:  24: BNP: 1756 improved from 3122 on lasix 40 mg daily.  bun/cr: 23/0.9  pt feels 98% better vs prior and improved at this time. pt no problems walking and no longer comatose. pt weight 129 lbs.

## 2024-05-14 NOTE — PHYSICAL EXAM
[Normal Venous Pressure] : normal venous pressure [Normal S1, S2] : normal S1, S2 [Clear Lung Fields] : clear lung fields [Soft] : abdomen soft [No Edema] : no edema [de-identified] : RRR

## 2024-05-14 NOTE — CARDIOLOGY SUMMARY
[de-identified] : 3/22/23: \par  1. The left ventricular systolic function is mildly decreased with an ejection fraction of 44 %.\par  2. E' sept: .03 m/s E/e': 12.4 m/s E stacey: 0.41 m/s, LVOT stacey: 1.08 m/s.\par  3. The left atrium is mildly dilated.\par  4. The right atrium is mildly dilated.\par  5. Moderate to severe mitral regurgitation.\par  6. Mild aortic stenosis.\par  7. Mild calcification of the aortic valve leaflets.\par  8. Mildly reduced systolic function.\par  9. Moderate aortic regurgitation.\par  10. Pacer lead noted in the right atrium.\par  11. Thickened mitral valve leaflets.

## 2024-06-17 ENCOUNTER — NON-APPOINTMENT (OUTPATIENT)
Age: 74
End: 2024-06-17

## 2024-06-17 ENCOUNTER — APPOINTMENT (OUTPATIENT)
Dept: CARDIOLOGY | Facility: CLINIC | Age: 74
End: 2024-06-17
Payer: MEDICARE

## 2024-06-17 PROCEDURE — 93296 REM INTERROG EVL PM/IDS: CPT

## 2024-06-17 PROCEDURE — 93294 REM INTERROG EVL PM/LDLS PM: CPT

## 2024-07-08 ENCOUNTER — RX RENEWAL (OUTPATIENT)
Age: 74
End: 2024-07-08

## 2024-07-08 RX ORDER — FUROSEMIDE 20 MG/1
20 TABLET ORAL DAILY
Qty: 90 | Refills: 3 | Status: ACTIVE | COMMUNITY
Start: 2024-07-08 | End: 1900-01-01

## 2024-08-29 ENCOUNTER — RX RENEWAL (OUTPATIENT)
Age: 74
End: 2024-08-29

## 2024-09-04 ENCOUNTER — APPOINTMENT (OUTPATIENT)
Dept: CARDIOLOGY | Facility: CLINIC | Age: 74
End: 2024-09-04
Payer: MEDICARE

## 2024-09-04 PROCEDURE — 93880 EXTRACRANIAL BILAT STUDY: CPT

## 2024-09-12 ENCOUNTER — APPOINTMENT (OUTPATIENT)
Dept: CARDIOLOGY | Facility: CLINIC | Age: 74
End: 2024-09-12
Payer: MEDICARE

## 2024-09-12 VITALS — WEIGHT: 129 LBS | HEIGHT: 66 IN | BODY MASS INDEX: 20.73 KG/M2

## 2024-09-12 VITALS — SYSTOLIC BLOOD PRESSURE: 120 MMHG | HEART RATE: 68 BPM | DIASTOLIC BLOOD PRESSURE: 70 MMHG

## 2024-09-12 DIAGNOSIS — I51.9 HEART DISEASE, UNSPECIFIED: ICD-10-CM

## 2024-09-12 DIAGNOSIS — I51.7 CARDIOMEGALY: ICD-10-CM

## 2024-09-12 DIAGNOSIS — I65.23 OCCLUSION AND STENOSIS OF BILATERAL CAROTID ARTERIES: ICD-10-CM

## 2024-09-12 DIAGNOSIS — I35.1 NONRHEUMATIC AORTIC (VALVE) INSUFFICIENCY: ICD-10-CM

## 2024-09-12 DIAGNOSIS — I25.810 ATHEROSCLEROSIS OF CORONARY ARTERY BYPASS GRAFT(S) W/OUT ANGINA PECTORIS: ICD-10-CM

## 2024-09-12 DIAGNOSIS — I34.0 NONRHEUMATIC MITRAL (VALVE) INSUFFICIENCY: ICD-10-CM

## 2024-09-12 DIAGNOSIS — I25.2 OLD MYOCARDIAL INFARCTION: ICD-10-CM

## 2024-09-12 PROCEDURE — 93000 ELECTROCARDIOGRAM COMPLETE: CPT

## 2024-09-12 PROCEDURE — 99214 OFFICE O/P EST MOD 30 MIN: CPT

## 2024-09-12 PROCEDURE — G2211 COMPLEX E/M VISIT ADD ON: CPT

## 2024-09-12 NOTE — PHYSICAL EXAM
[Normal Venous Pressure] : normal venous pressure [Normal S1, S2] : normal S1, S2 [Clear Lung Fields] : clear lung fields [Soft] : abdomen soft [No Edema] : no edema [de-identified] : RRR

## 2024-09-12 NOTE — PHYSICAL EXAM
[Normal Venous Pressure] : normal venous pressure [Normal S1, S2] : normal S1, S2 [Clear Lung Fields] : clear lung fields [Soft] : abdomen soft [No Edema] : no edema [de-identified] : RRR

## 2024-09-12 NOTE — CARDIOLOGY SUMMARY
[de-identified] : 3/22/23: \par  1. The left ventricular systolic function is mildly decreased with an ejection fraction of 44 %.\par  2. E' sept: .03 m/s E/e': 12.4 m/s E stacey: 0.41 m/s, LVOT stacey: 1.08 m/s.\par  3. The left atrium is mildly dilated.\par  4. The right atrium is mildly dilated.\par  5. Moderate to severe mitral regurgitation.\par  6. Mild aortic stenosis.\par  7. Mild calcification of the aortic valve leaflets.\par  8. Mildly reduced systolic function.\par  9. Moderate aortic regurgitation.\par  10. Pacer lead noted in the right atrium.\par  11. Thickened mitral valve leaflets.

## 2024-09-12 NOTE — HISTORY OF PRESENT ILLNESS
[FreeTextEntry1] : PT with HTN, CAD S/P PCI IN PAST, CABG HCM S/P MYOMECTOMY  [facility], PT HAD PRIOR MYOMECTOMY MANY YEARS AGO.  pt had NSTEMI patent lima to lad and svg to rpda and  ppm   mild lv dysfunction.   :  Mild LVH, decreased LVEF 45% with anteroseptal and apical hypokinesis, mod LAE, increased RV wall thickness, Moderate AR, PHT: 291 ms LVOT stacey: 0.96 m/s  pt is nyha class 2.   3/31/23:  3/23: echo: lvef 45% JOSE, increased LVFP, mod to severe MR  gfr: 67 hdl 73 LDL 83 BNP ORDERED NOT DONE.  pt had numbness and pain last time in  and w/u in [place], but has had no further and cp any further, pt says able to go up 2 flights of stairs. pt has ovarian mass preop for surgery at [facility].   23: BNP: 478, HDL: 71, T, LDL: 86,  Patient c/o sob going up hill. Patient denies cp. Patient states she does not have a car so she is always walking.  Patient had echo on 23 from [facility]. Pt had hysterectomy and concerned about getting valvular surgery. pt had lvef of 45% and concerned.   23: Echo: lvef 50%, apical hypokinesis, aortic regurgitation moderate/aortic stenosis, lvef 60%, pt saw dr. quijano and doing well. pt sob going up a hill.   3/25/24:  3/1/24: gfr: 62 ast/alt 40/37 mildly elevated, BNP: 2125 increased bun/cr: 24/0.96 LDL: 79 A1c: 5.4  pt back from [place] and says sob with walking up hills and says [place] was better but was driving more than.  pt had metoprolol lowered by DR. GEORGE.  pt bnp elevated  was bnp: 478, pt wants to go back [place].   24: 3/26/24: BNP: 4033. increased vs 2125  Start Furosemide 20mg for 4 days and was to repeat bnp  Echo: Lvef: 48%, apical hypokinesis, mild  to mod AR, mod to SEVERE MR, MILD TO MOD AS peak e wave nwv and A wave: 1 m/s, elevated lvedp, lvot vit; 11.3 cm, rv tapse: 1.5 cm, JOSE, TR stacey normal.  pt currently taking water pill daily.  24: BNP: 3122   24: Patient comes in emergently because patient called office stating she is having SOB. Patient also c/o fatigue and numbness in her arm. Patient states taking Furosemide 20mg daily. Patient states sob is worsening.   pt sob with exerting herself. pt fatigued and cometose by 8:00 pm at night. pt sob and worsening. pt stopped 3 times on parking lot yesterday.  EKG: paced rhythm.   24:  24: BNP: 1756 improved from 3122 on lasix 40 mg daily.  bun/cr: 23/0.9  pt feels 98% better vs prior and improved at this time. pt no problems walking and no longer comatose. pt weight 129 lbs.   24: 24:  Right: proximal ICA <50% stenosis. Left: proximal ICA <50% stenosis. Vertebral arteries: antegrade flow bilaterally. Subclavian arteries: no significant atherosclerosis bilaterally. Limited exam bilaterally due to high carotid bifurcation. Patient denies cp, dizziness, or palpitations. Patient c/o sob sometimes with over exertion and climbing hills and stairs patient stops for a few seconds and keeps going. Patient states had an episode of chest pressure that lasts a few seconds that happened about 4-6 weeks ago and infrequent. Patient states it happens at rest after doing something exertional. Patient states symptoms improved after relieving gas.

## 2024-09-12 NOTE — HISTORY OF PRESENT ILLNESS
[FreeTextEntry1] : PT with HTN, CAD S/P PCI IN PAST, CABG HCM S/P MYOMECTOMY  [facility], PT HAD PRIOR MYOMECTOMY MANY YEARS AGO.  pt had NSTEMI patent lima to lad and svg to rpda and  ppm   mild lv dysfunction.   :  Mild LVH, decreased LVEF 45% with anteroseptal and apical hypokinesis, mod LAE, increased RV wall thickness, Moderate AR, PHT: 291 ms LVOT stacey: 0.96 m/s  pt is nyha class 2.   3/31/23:  3/23: echo: lvef 45% JOSE, increased LVFP, mod to severe MR  gfr: 67 hdl 73 LDL 83 BNP ORDERED NOT DONE.  pt had numbness and pain last time in  and w/u in [place], but has had no further and cp any further, pt says able to go up 2 flights of stairs. pt has ovarian mass preop for surgery at [facility].   23: BNP: 478, HDL: 71, T, LDL: 86,  Patient c/o sob going up hill. Patient denies cp. Patient states she does not have a car so she is always walking.  Patient had echo on 23 from [facility]. Pt had hysterectomy and concerned about getting valvular surgery. pt had lvef of 45% and concerned.   23: Echo: lvef 50%, apical hypokinesis, aortic regurgitation moderate/aortic stenosis, lvef 60%, pt saw dr. quijano and doing well. pt sob going up a hill.   3/25/24:  3/1/24: gfr: 62 ast/alt 40/37 mildly elevated, BNP: 2125 increased bun/cr: 24/0.96 LDL: 79 A1c: 5.4  pt back from [place] and says sob with walking up hills and says [place] was better but was driving more than.  pt had metoprolol lowered by DR. GEORGE.  pt bnp elevated  was bnp: 478, pt wants to go back [place].   24: 3/26/24: BNP: 4033. increased vs 2125  Start Furosemide 20mg for 4 days and was to repeat bnp  Echo: Lvef: 48%, apical hypokinesis, mild  to mod AR, mod to SEVERE MR, MILD TO MOD AS peak e wave nwv and A wave: 1 m/s, elevated lvedp, lvot vit; 11.3 cm, rv tapse: 1.5 cm, JOSE, TR stacey normal.  pt currently taking water pill daily.  24: BNP: 3122   24: Patient comes in emergently because patient called office stating she is having SOB. Patient also c/o fatigue and numbness in her arm. Patient states taking Furosemide 20mg daily. Patient states sob is worsening.   pt sob with exerting herself. pt fatigued and cometose by 8:00 pm at night. pt sob and worsening. pt stopped 3 times on parking lot yesterday.  EKG: paced rhythm.   24:  24: BNP: 1756 improved from 3122 on lasix 40 mg daily.  bun/cr: 23/0.9  pt feels 98% better vs prior and improved at this time. pt no problems walking and no longer comatose. pt weight 129 lbs.   24: 24:  Right: proximal ICA <50% stenosis. Left: proximal ICA <50% stenosis. Vertebral arteries: antegrade flow bilaterally. Subclavian arteries: no significant atherosclerosis bilaterally. Limited exam bilaterally due to high carotid bifurcation. Patient denies cp, dizziness, or palpitations. Patient c/o sob sometimes with over exertion and climbing hills and stairs patient stops for a few seconds and keeps going. Patient states had an episode of chest pressure that lasts a few seconds that happened about 4-6 weeks ago and infrequent. Patient states it happens at rest after doing something exertional. Patient states symptoms improved after relieving gas.

## 2024-09-12 NOTE — CARDIOLOGY SUMMARY
[de-identified] : 3/22/23: \par  1. The left ventricular systolic function is mildly decreased with an ejection fraction of 44 %.\par  2. E' sept: .03 m/s E/e': 12.4 m/s E stacey: 0.41 m/s, LVOT stacey: 1.08 m/s.\par  3. The left atrium is mildly dilated.\par  4. The right atrium is mildly dilated.\par  5. Moderate to severe mitral regurgitation.\par  6. Mild aortic stenosis.\par  7. Mild calcification of the aortic valve leaflets.\par  8. Mildly reduced systolic function.\par  9. Moderate aortic regurgitation.\par  10. Pacer lead noted in the right atrium.\par  11. Thickened mitral valve leaflets.

## 2024-09-12 NOTE — DISCUSSION/SUMMARY
[FreeTextEntry1] : mod to severe MR and lvef 48% in 6/22  pt to continue metoprolol er 50 mg po qd  continue atorvastatin 80 mg po qhs  continue aspirin 81  elevated bnp but not symptomatic  use furosemide 40 daily as feeling ok and d/w patient to adjust based on weight.  MR stable and improved if worsened will get further w/u.  Patient to call back if chest pressure symptoms worsen or become more frequent.  Bloodwork/4 months

## 2024-09-16 ENCOUNTER — NON-APPOINTMENT (OUTPATIENT)
Age: 74
End: 2024-09-16

## 2024-09-16 ENCOUNTER — APPOINTMENT (OUTPATIENT)
Dept: ELECTROPHYSIOLOGY | Facility: CLINIC | Age: 74
End: 2024-09-16
Payer: MEDICARE

## 2024-09-16 VITALS
SYSTOLIC BLOOD PRESSURE: 126 MMHG | TEMPERATURE: 98 F | WEIGHT: 126 LBS | BODY MASS INDEX: 20.25 KG/M2 | DIASTOLIC BLOOD PRESSURE: 72 MMHG | HEIGHT: 66 IN | HEART RATE: 80 BPM

## 2024-09-16 DIAGNOSIS — I44.2 ATRIOVENTRICULAR BLOCK, COMPLETE: ICD-10-CM

## 2024-09-16 DIAGNOSIS — I10 ESSENTIAL (PRIMARY) HYPERTENSION: ICD-10-CM

## 2024-09-16 DIAGNOSIS — I42.2 OTHER HYPERTROPHIC CARDIOMYOPATHY: ICD-10-CM

## 2024-09-16 DIAGNOSIS — Z45.018 ENCOUNTER FOR ADJUSTMENT AND MANAGEMENT OF OTHER PART OF CARDIAC PACEMAKER: ICD-10-CM

## 2024-09-16 PROCEDURE — 93280 PM DEVICE PROGR EVAL DUAL: CPT

## 2024-09-16 PROCEDURE — 99214 OFFICE O/P EST MOD 30 MIN: CPT

## 2024-09-16 NOTE — HISTORY OF PRESENT ILLNESS
[de-identified] : Cardio: Dr. Nguyen   74 years old female admitted in [facility] on 4/18 thru ED in Bradycardia with complete AVB. Was intubated and  TVP was inserted.  PMH includes HTN, osteoporosis, CAD with multiple PCI 5-6 years ago. She had  CABG (at [facility]) and as per patient she had a myomectomy done at same time in [facility] July 2021. Echo on 4/18 showed EF of 55% with moderate to severe MR. She was taken to the cardiac cath lab for NSTEMI found to have patent SVG to RPDA and patent LIMA to LAD. Once stable and extubated she received a dual chamber PPM on 4/19 by Dr. Banks.  Cardiologist: Dr. Tran and Dr. Nguyen She travels to and from [place] several times a year.  She presents today for follow up. Of note, she underwent total hysterectomy April 2023. The patient is feeling well and has no cardiac complaints. Denies CP, palpitations, SOB, dizziness, TORRE, PND, syncope.   Feels much better and activity tolerance much improved since starting Lasix earlier this year.

## 2024-09-16 NOTE — ASSESSMENT
[FreeTextEntry1] : 74 years old female s/p Dual chamber pacemaker on 4/19/2022 for complete heart block (dependent)    # Device interrogation - CHB  - Device interrogation normal. I interrogated and reprogrammed the device as described above.  - She is on remote and transmitting. - Not dependent at LOV however patient is dependent today.   HCM - Follows with cliinc at [facility] - Is having TTE there this month  NSVT - TTE from 3/2024 shows EF 48%, mod  - severe mitral regurg. No symptoms. - Having TTE at [facility] cllinic this month. - Cath 4/18/2022 significant native double vessel CAD with patent LIMA to LAD and SVG to RPDA.  - Continue Toprol XL 25mg QD.  - Will discuss with Dr. Fields.   HTN - BP WNL. - Continue furosemide 40mg QD, metoprolol.  - F/U gen cards/PCP  RTO in 9-12 months with NP/MK

## 2024-09-16 NOTE — PHYSICAL EXAM
[General Appearance - Well Developed] : well developed [Well Groomed] : well groomed [General Appearance - Well Nourished] : well nourished [General Appearance - In No Acute Distress] : no acute distress [Heart Rate And Rhythm] : heart rate and rhythm were normal [Heart Sounds] : normal S1 and S2 [Edema] : no peripheral edema present [] : no respiratory distress [Respiration, Rhythm And Depth] : normal respiratory rhythm and effort [Left Infraclavicular] : left infraclavicular area [Clean] : clean [Dry] : dry [Well-Healed] : well-healed [Abdomen Soft] : soft [Nail Clubbing] : no clubbing of the fingernails [Cyanosis, Localized] : no localized cyanosis

## 2024-09-16 NOTE — PROCEDURE
[NSR] : normal sinus rhythm [See Device Printout] : See device printout [Pacemaker] : pacemaker [DDDR] : DDDR [Voltage: ___ volts] : Voltage was [unfilled] volts [Longevity: ___ months] : The estimated remaining battery life is [unfilled] months [Threshold Testing Performed] : Threshold testing was performed [Lead Imp:  ___ohms] : lead impedance was [unfilled] ohms [Sensing Amplitude ___mv] : sensing amplitude was [unfilled] mv [___V @] : [unfilled] V [___ ms] : [unfilled] ms [None] : none [Programmed for Longevity] : output reprogrammed for improved battery longevity [Normal] : The battery status is normal. [de-identified] : SANDRA DAVEY DR MRI [de-identified] : [id number] [de-identified] : [id number] [de-identified] : 4/19/2022 [de-identified] : 60/130 [de-identified] : AP: 32.1% // : 80.1% 3 NSVT episodes. Last episode 8/1/24 AVG Ventricular rate 214 bpm duration 1 sec. Longest on 4/8/24, duration 6 secs.  Normal device function.  Transmitting on [employer].